# Patient Record
Sex: FEMALE | Race: BLACK OR AFRICAN AMERICAN | Employment: UNEMPLOYED | URBAN - METROPOLITAN AREA
[De-identification: names, ages, dates, MRNs, and addresses within clinical notes are randomized per-mention and may not be internally consistent; named-entity substitution may affect disease eponyms.]

---

## 2020-12-15 ENCOUNTER — TELEPHONE (OUTPATIENT)
Dept: FAMILY MEDICINE CLINIC | Age: 45
End: 2020-12-15

## 2020-12-15 NOTE — TELEPHONE ENCOUNTER
----- Message from Ale Benavides sent at 12/11/2020  6:14 PM EST -----  Regarding: Dr. Mackenzie Beltran not available    Caller's first and last name and relationship to patient (if not the patient):      Best contact number: [phone]      Preferred date and time: First available       Scheduled appointment date and time: N/A       Reason for appointment: Pt is requesting to have a NP and a worker comp. Paperwork that needs to be completed. She has a complex medical history and wants some assistance soon.   (Pt was very Irate about not getting care)      Details to clarify the request:      Ale Benavides

## 2022-05-11 ENCOUNTER — TELEPHONE (OUTPATIENT)
Dept: FAMILY MEDICINE CLINIC | Age: 47
End: 2022-05-11
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
## 2022-12-15 ENCOUNTER — HOSPITAL ENCOUNTER (EMERGENCY)
Age: 47
Discharge: PSYCHIATRIC OR SUBSTANCE USE FACILITY | End: 2022-12-15
Attending: EMERGENCY MEDICINE
Payer: MEDICARE

## 2022-12-15 ENCOUNTER — HOSPITAL ENCOUNTER (INPATIENT)
Age: 47
LOS: 16 days | Discharge: HOME | End: 2022-12-31
Attending: PSYCHIATRY & NEUROLOGY | Admitting: PSYCHIATRY & NEUROLOGY
Payer: MEDICARE

## 2022-12-15 VITALS
HEART RATE: 92 BPM | SYSTOLIC BLOOD PRESSURE: 119 MMHG | HEIGHT: 65 IN | WEIGHT: 274 LBS | DIASTOLIC BLOOD PRESSURE: 76 MMHG | RESPIRATION RATE: 20 BRPM | BODY MASS INDEX: 45.65 KG/M2 | OXYGEN SATURATION: 93 % | TEMPERATURE: 98.3 F

## 2022-12-15 DIAGNOSIS — F23 ACUTE PSYCHOSIS (HCC): Primary | ICD-10-CM

## 2022-12-15 PROBLEM — F31.9 BIPOLAR DISORDER WITH PSYCHOTIC FEATURES (HCC): Status: ACTIVE | Noted: 2022-12-15

## 2022-12-15 PROBLEM — F29 PSYCHOSIS (HCC): Status: ACTIVE | Noted: 2022-12-15

## 2022-12-15 LAB
ALBUMIN SERPL-MCNC: 3.4 G/DL (ref 3.5–5)
ALBUMIN/GLOB SERPL: 0.9 {RATIO} (ref 1.1–2.2)
ALP SERPL-CCNC: 94 U/L (ref 45–117)
ALT SERPL-CCNC: 41 U/L (ref 12–78)
AMPHET UR QL SCN: NEGATIVE
ANION GAP SERPL CALC-SCNC: 6 MMOL/L (ref 5–15)
APPEARANCE UR: CLEAR
AST SERPL-CCNC: 11 U/L (ref 15–37)
BACTERIA URNS QL MICRO: ABNORMAL /HPF
BARBITURATES UR QL SCN: NEGATIVE
BASOPHILS # BLD: 0 K/UL (ref 0–0.1)
BASOPHILS NFR BLD: 0 % (ref 0–1)
BDY SITE: ABNORMAL
BENZODIAZ UR QL: NEGATIVE
BILIRUB SERPL-MCNC: 0.2 MG/DL (ref 0.2–1)
BILIRUB UR QL: NEGATIVE
BUN SERPL-MCNC: 11 MG/DL (ref 6–20)
BUN/CREAT SERPL: 13 (ref 12–20)
CALCIUM SERPL-MCNC: 9.5 MG/DL (ref 8.5–10.1)
CANNABINOIDS UR QL SCN: NEGATIVE
CHLORIDE SERPL-SCNC: 107 MMOL/L (ref 97–108)
CO2 SERPL-SCNC: 23 MMOL/L (ref 21–32)
COCAINE UR QL SCN: NEGATIVE
COHGB MFR BLD: 0.8 % (ref 1–2)
COLOR UR: ABNORMAL
COMMENT, HOLDF: NORMAL
CREAT SERPL-MCNC: 0.86 MG/DL (ref 0.55–1.02)
DIFFERENTIAL METHOD BLD: ABNORMAL
DRUG SCRN COMMENT,DRGCM: NORMAL
EOSINOPHIL # BLD: 0 K/UL (ref 0–0.4)
EOSINOPHIL NFR BLD: 0 % (ref 0–7)
EPITH CASTS URNS QL MICRO: ABNORMAL /LPF
ERYTHROCYTE [DISTWIDTH] IN BLOOD BY AUTOMATED COUNT: 14.5 % (ref 11.5–14.5)
ETHANOL SERPL-MCNC: <10 MG/DL
FLUAV RNA SPEC QL NAA+PROBE: NOT DETECTED
FLUBV RNA SPEC QL NAA+PROBE: NOT DETECTED
GLOBULIN SER CALC-MCNC: 3.8 G/DL (ref 2–4)
GLUCOSE SERPL-MCNC: 210 MG/DL (ref 65–100)
GLUCOSE UR STRIP.AUTO-MCNC: NEGATIVE MG/DL
HCG SERPL QL: NEGATIVE
HCT VFR BLD AUTO: 39.5 % (ref 35–47)
HGB BLD-MCNC: 12.5 G/DL (ref 11.5–16)
HGB UR QL STRIP: NEGATIVE
HYALINE CASTS URNS QL MICRO: ABNORMAL /LPF (ref 0–5)
IMM GRANULOCYTES # BLD AUTO: 0 K/UL (ref 0–0.04)
IMM GRANULOCYTES NFR BLD AUTO: 1 % (ref 0–0.5)
KETONES UR QL STRIP.AUTO: NEGATIVE MG/DL
LEUKOCYTE ESTERASE UR QL STRIP.AUTO: ABNORMAL
LIPASE SERPL-CCNC: 126 U/L (ref 73–393)
LYMPHOCYTES # BLD: 1 K/UL (ref 0.8–3.5)
LYMPHOCYTES NFR BLD: 17 % (ref 12–49)
MCH RBC QN AUTO: 27.9 PG (ref 26–34)
MCHC RBC AUTO-ENTMCNC: 31.6 G/DL (ref 30–36.5)
MCV RBC AUTO: 88.2 FL (ref 80–99)
METHADONE UR QL: NEGATIVE
METHGB MFR BLD: 0.3 % (ref 0–1.4)
MONOCYTES # BLD: 0.3 K/UL (ref 0–1)
MONOCYTES NFR BLD: 6 % (ref 5–13)
NEUTS SEG # BLD: 4.3 K/UL (ref 1.8–8)
NEUTS SEG NFR BLD: 76 % (ref 32–75)
NITRITE UR QL STRIP.AUTO: NEGATIVE
NRBC # BLD: 0 K/UL (ref 0–0.01)
NRBC BLD-RTO: 0 PER 100 WBC
OPIATES UR QL: NEGATIVE
OXYHGB MFR BLD: 61 % (ref 94–97)
PCP UR QL: NEGATIVE
PH UR STRIP: 7 [PH] (ref 5–8)
PLATELET # BLD AUTO: 294 K/UL (ref 150–400)
PMV BLD AUTO: 10.3 FL (ref 8.9–12.9)
POTASSIUM SERPL-SCNC: 4 MMOL/L (ref 3.5–5.1)
PROT SERPL-MCNC: 7.2 G/DL (ref 6.4–8.2)
PROT UR STRIP-MCNC: NEGATIVE MG/DL
RBC # BLD AUTO: 4.48 M/UL (ref 3.8–5.2)
RBC #/AREA URNS HPF: ABNORMAL /HPF (ref 0–5)
SAMPLES BEING HELD,HOLD: NORMAL
SAO2 % BLD: 62 % (ref 95–99)
SARS-COV-2, COV2: NOT DETECTED
SODIUM SERPL-SCNC: 136 MMOL/L (ref 136–145)
SP GR UR REFRACTOMETRY: 1.01 (ref 1–1.03)
SPECIMEN SITE: ABNORMAL
UR CULT HOLD, URHOLD: NORMAL
UROBILINOGEN UR QL STRIP.AUTO: 0.2 EU/DL (ref 0.2–1)
WBC # BLD AUTO: 5.6 K/UL (ref 3.6–11)
WBC URNS QL MICRO: ABNORMAL /HPF (ref 0–4)

## 2022-12-15 PROCEDURE — 80053 COMPREHEN METABOLIC PANEL: CPT

## 2022-12-15 PROCEDURE — 82077 ASSAY SPEC XCP UR&BREATH IA: CPT

## 2022-12-15 PROCEDURE — 99285 EMERGENCY DEPT VISIT HI MDM: CPT

## 2022-12-15 PROCEDURE — 80307 DRUG TEST PRSMV CHEM ANLYZR: CPT

## 2022-12-15 PROCEDURE — 87636 SARSCOV2 & INF A&B AMP PRB: CPT

## 2022-12-15 PROCEDURE — 74011000250 HC RX REV CODE- 250: Performed by: EMERGENCY MEDICINE

## 2022-12-15 PROCEDURE — 84703 CHORIONIC GONADOTROPIN ASSAY: CPT

## 2022-12-15 PROCEDURE — 65220000001 HC RM PRIVATE PSYCH

## 2022-12-15 PROCEDURE — 74011250636 HC RX REV CODE- 250/636: Performed by: EMERGENCY MEDICINE

## 2022-12-15 PROCEDURE — 83690 ASSAY OF LIPASE: CPT

## 2022-12-15 PROCEDURE — 36415 COLL VENOUS BLD VENIPUNCTURE: CPT

## 2022-12-15 PROCEDURE — 85025 COMPLETE CBC W/AUTO DIFF WBC: CPT

## 2022-12-15 PROCEDURE — 90791 PSYCH DIAGNOSTIC EVALUATION: CPT

## 2022-12-15 PROCEDURE — 82375 ASSAY CARBOXYHB QUANT: CPT

## 2022-12-15 PROCEDURE — 81001 URINALYSIS AUTO W/SCOPE: CPT

## 2022-12-15 PROCEDURE — 96372 THER/PROPH/DIAG INJ SC/IM: CPT

## 2022-12-15 RX ORDER — ADHESIVE BANDAGE
30 BANDAGE TOPICAL DAILY PRN
Status: DISCONTINUED | OUTPATIENT
Start: 2022-12-15 | End: 2022-12-31 | Stop reason: HOSPADM

## 2022-12-15 RX ORDER — ACETAMINOPHEN 325 MG/1
650 TABLET ORAL
Status: DISCONTINUED | OUTPATIENT
Start: 2022-12-15 | End: 2022-12-31 | Stop reason: HOSPADM

## 2022-12-15 RX ORDER — BENZTROPINE MESYLATE 1 MG/1
1 TABLET ORAL
Status: DISCONTINUED | OUTPATIENT
Start: 2022-12-15 | End: 2022-12-15

## 2022-12-15 RX ORDER — OLANZAPINE 5 MG/1
5 TABLET ORAL
Status: DISCONTINUED | OUTPATIENT
Start: 2022-12-15 | End: 2022-12-15

## 2022-12-15 RX ORDER — LORAZEPAM 2 MG/ML
1 INJECTION INTRAMUSCULAR
Status: DISCONTINUED | OUTPATIENT
Start: 2022-12-15 | End: 2022-12-31 | Stop reason: HOSPADM

## 2022-12-15 RX ORDER — TRAZODONE HYDROCHLORIDE 50 MG/1
50 TABLET ORAL
Status: DISCONTINUED | OUTPATIENT
Start: 2022-12-15 | End: 2022-12-31 | Stop reason: HOSPADM

## 2022-12-15 RX ORDER — DIPHENHYDRAMINE HYDROCHLORIDE 50 MG/ML
50 INJECTION, SOLUTION INTRAMUSCULAR; INTRAVENOUS
Status: DISCONTINUED | OUTPATIENT
Start: 2022-12-15 | End: 2022-12-15

## 2022-12-15 RX ORDER — LORAZEPAM 2 MG/ML
1 INJECTION INTRAMUSCULAR
Status: DISCONTINUED | OUTPATIENT
Start: 2022-12-15 | End: 2022-12-15

## 2022-12-15 RX ORDER — OLANZAPINE 10 MG/1
10 TABLET ORAL
Status: DISCONTINUED | OUTPATIENT
Start: 2022-12-15 | End: 2022-12-31 | Stop reason: HOSPADM

## 2022-12-15 RX ORDER — HYDROXYZINE 50 MG/1
50 TABLET, FILM COATED ORAL
Status: DISCONTINUED | OUTPATIENT
Start: 2022-12-15 | End: 2022-12-31 | Stop reason: HOSPADM

## 2022-12-15 RX ORDER — LORAZEPAM 1 MG/1
1 TABLET ORAL
Status: DISCONTINUED | OUTPATIENT
Start: 2022-12-15 | End: 2022-12-31 | Stop reason: HOSPADM

## 2022-12-15 RX ORDER — OLANZAPINE 10 MG/1
10 INJECTION, POWDER, LYOPHILIZED, FOR SOLUTION INTRAMUSCULAR
Status: DISCONTINUED | OUTPATIENT
Start: 2022-12-15 | End: 2022-12-31 | Stop reason: HOSPADM

## 2022-12-15 RX ORDER — DIAZEPAM 10 MG/2ML
10 INJECTION INTRAMUSCULAR ONCE
Status: COMPLETED | OUTPATIENT
Start: 2022-12-15 | End: 2022-12-15

## 2022-12-15 RX ADMIN — DIAZEPAM 10 MG: 5 INJECTION, SOLUTION INTRAMUSCULAR; INTRAVENOUS at 06:31

## 2022-12-15 RX ADMIN — OLANZAPINE 10 MG: 10 INJECTION, POWDER, LYOPHILIZED, FOR SOLUTION INTRAMUSCULAR at 06:31

## 2022-12-15 NOTE — ED NOTES
Pt changed into green gown. Belongings relinquished to [employer] per their request.     Room secured. [employer] at bedside.
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
## 2022-12-15 NOTE — ED NOTES
Per [employer], patient has been made a TDO by [facility] with [facility]
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
## 2022-12-15 NOTE — ED NOTES
Per Anant Frey with THE [facility], patient is not a TDO at this time. David Bess has been notified to see patient and Marzena Peguero is being pulled in to see if they can offer some assistance.
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
## 2022-12-15 NOTE — ED NOTES
[address] [facility] Department here to transport the patient to [facility] mental health unit.
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
## 2022-12-15 NOTE — ED NOTES
I am being told now that the patient is undergoing a TDO process. Her carboxyhemoglobin appears to be low. Patient will be cleared medically for psychiatric admission.
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
## 2022-12-15 NOTE — ED NOTES
Patient was placed under ECO at 0543 and at 0900 patient is now a TDO.  is at bedside. Patient belongings were taken by [facility]. Sitter at bedside.

## 2022-12-15 NOTE — ED NOTES
Patient has been accepted at [facility] for psychiatric admission by Dr. César Collier. Patient is stable in the ED and is suitable for transfer. EMTALA is filled out.

## 2022-12-16 PROCEDURE — 74011250636 HC RX REV CODE- 250/636: Performed by: PSYCHIATRY & NEUROLOGY

## 2022-12-16 PROCEDURE — 65220000001 HC RM PRIVATE PSYCH

## 2022-12-16 PROCEDURE — 74011250637 HC RX REV CODE- 250/637: Performed by: PSYCHIATRY & NEUROLOGY

## 2022-12-16 RX ORDER — ARIPIPRAZOLE 5 MG/1
30 TABLET ORAL DAILY
Status: DISCONTINUED | OUTPATIENT
Start: 2022-12-17 | End: 2022-12-16

## 2022-12-16 RX ORDER — ARIPIPRAZOLE 5 MG/1
30 TABLET ORAL DAILY
Status: DISCONTINUED | OUTPATIENT
Start: 2022-12-16 | End: 2022-12-20

## 2022-12-16 RX ORDER — DIVALPROEX SODIUM 500 MG/1
500 TABLET, DELAYED RELEASE ORAL 2 TIMES DAILY
Status: DISCONTINUED | OUTPATIENT
Start: 2022-12-16 | End: 2022-12-20

## 2022-12-16 RX ADMIN — OLANZAPINE 10 MG: 10 INJECTION, POWDER, FOR SOLUTION INTRAMUSCULAR at 11:17

## 2022-12-16 RX ADMIN — OLANZAPINE 10 MG: 10 TABLET, FILM COATED ORAL at 20:56

## 2022-12-16 RX ADMIN — DIVALPROEX SODIUM 500 MG: 500 TABLET, DELAYED RELEASE ORAL at 20:56

## 2022-12-16 RX ADMIN — TRAZODONE HYDROCHLORIDE 50 MG: 50 TABLET ORAL at 20:56

## 2022-12-16 NOTE — GROUP NOTE
IP  GROUP DOCUMENTATION INDIVIDUAL                                                                          Group Therapy Note    Date: 12/16/2022    Group Start Time: 1525  Group End Time: 4602  Group Topic: Recreational/Music Therapy    SRM 2  NON ACUTE    Isabela Scott    IP [address] [facility] GROUP DOCUMENTATION GROUP    Group Therapy Note    Facilitated leisure skills group to reinforce positive coping and to manage mood through music, social interaction, group activities and art task    Attendees: 5/9       Attendance: Did not attend    Additional Notes:  Encouraged but did not attend    BRENNON DominguezS

## 2022-12-16 NOTE — BH NOTES
PSA PART II ADDITIONAL INFORMATION        Access To [facility]: No    Substance Use: NO    Last Use:     Type of Substance: no substance use    Frequency of Use:     Request to See : NO    If yes, notified : NO    Guardian:NO    Guardian Contact:    Release of Information Signed: NO    Release of Information Signed For:     Treatment Plan: pt refused to sign at this time
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
## 2022-12-16 NOTE — GROUP NOTE
IP  GROUP DOCUMENTATION INDIVIDUAL                                                                          Group Therapy Note    Date: 12/16/2022    Group Start Time: 1120  Group End Time: 1200  Group Topic: Education Group - Inpatient    [facility] 2  NON ACUTE    Loretta Babinski    IP [address] [facility] GROUP DOCUMENTATION GROUP    Group Therapy Note    Facilitated discussion on  stress exploration focused on  being able to identify daily hassles, major life changes and life circumstances that contribute to stress and also identify daily uplifts, healthy coping strategies and protective factors that counteract stress    Attendees: 4/12       Attendance: Did not attend    Additional Notes:  Encouraged but did attend    CORNELL Lyons

## 2022-12-16 NOTE — BH NOTES
Pt up on unit sitting in dayroom, talking loud at times , denies any SI/HI, denies any depression or anxiety. Pt is religiously pre occupied sitting in room praying loud. Pt remain on close observation.
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
## 2022-12-16 NOTE — PROGRESS NOTES
Problem: Falls - Risk of  Goal: *Absence of Falls  Description: Document Pratt Reason Fall Risk and appropriate interventions in the flowsheet.   Outcome: Progressing Towards Goal  Note: Fall Risk Interventions:            Medication Interventions: Teach patient to arise slowly
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
## 2022-12-17 PROCEDURE — 65220000001 HC RM PRIVATE PSYCH

## 2022-12-17 PROCEDURE — 74011250637 HC RX REV CODE- 250/637: Performed by: PSYCHIATRY & NEUROLOGY

## 2022-12-17 RX ADMIN — ARIPIPRAZOLE 30 MG: 5 TABLET ORAL at 09:30

## 2022-12-17 RX ADMIN — DIVALPROEX SODIUM 500 MG: 500 TABLET, DELAYED RELEASE ORAL at 09:30

## 2022-12-17 RX ADMIN — DIVALPROEX SODIUM 500 MG: 500 TABLET, DELAYED RELEASE ORAL at 21:01

## 2022-12-17 RX ADMIN — OLANZAPINE 10 MG: 10 TABLET, FILM COATED ORAL at 16:50

## 2022-12-17 NOTE — GROUP NOTE
[facility]  GROUP DOCUMENTATION INDIVIDUAL                                                                          Group Therapy Note    Date: 12/17/2022    Group Start Time: 1030  Group End Time: 1100  Group Topic: Nursing    [facility] 2 BEHA [facility]    Lino Davis LPN IP  GROUP DOCUMENTATION GROUP    Group Therapy Note    Attendees: 5/8       Attendance: Did not attend    Patient's Goal:  Increase knowledge of ways to decrease stress    Interventions/techniques: Follows Directions:     Interactions:     Mental Status:     Behavior/appearance:     Goals Achieved: Additional Notes:  Pt. Invited she did not attend.     Jack Brasher LPN

## 2022-12-17 NOTE — GROUP NOTE
IP  GROUP DOCUMENTATION INDIVIDUAL                                                                          Group Therapy Note    Date: 12/17/2022    Group Start Time: 1120  Group End Time: 1200  Group Topic: Education Group - Inpatient    [facility] 2  NON ACUTE    Angelita Rodney    IP [facility]150 [facility] GROUP DOCUMENTATION GROUP    Group Therapy Note    Facilitated group to introduce a strategy to communicate in a more effective way that will help to express wants and needs in a way that is respectful to oneself and others to help increase a positive outcome from the interaction .  Provided handout relating to the strategy Dear Alexis, the meaning of the acronym and the description of each step with an example    Attendees: 5/8       Attendance: Did not attend    Additional Notes:  Encouraged but did not attend    CORNELL Vila
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
## 2022-12-17 NOTE — GROUP NOTE
IP  GROUP DOCUMENTATION INDIVIDUAL                                                                          Group Therapy Note    Date: 12/17/2022    Group Start Time: 1525  Group End Time: 1967  Group Topic: Recreational/Music Therapy    [facility] 2  NON ACUTE    Diamond Snellen    IP 1150 [facility] GROUP DOCUMENTATION GROUP    Group Therapy Note    Facilitated leisure skills group to reinforce positive coping and to manage mood through music, social interaction, group activities and art task    Attendees: 7/8       Attendance: Attended    Patient's Goal:  Attend group daily     Interventions/techniques: Art integration and Supported    Follows Directions: Followed directions    Interactions: Interacted appropriately    Mental Status: Calm and Flat    Behavior/appearance: Cooperative    Goals Achieved: Able to engage in interactions and Able to listen to others      Additional Notes:  Receptive to listening to music while working on leisure task.  Interacted with staff when prompted    CORNELL Mahoney
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
## 2022-12-17 NOTE — PROGRESS NOTES
Problem: Patient Education: Go to Patient Education Activity  Goal: Patient/Family Education  Outcome: Progressing Towards Goal     Problem: Falls - Risk of  Goal: *Absence of Falls  Description: Document Lillian Thurston Fall Risk and appropriate interventions in the flowsheet.   Outcome: Progressing Towards Goal  Note: Fall Risk Interventions:            Medication Interventions: Teach patient to arise slowly                   Problem: Risk for Elopement  Goal: Patient will not exit the unit/facility without proper escort  Outcome: Progressing Towards Goal

## 2022-12-18 PROCEDURE — 65220000001 HC RM PRIVATE PSYCH

## 2022-12-18 PROCEDURE — 74011250637 HC RX REV CODE- 250/637: Performed by: PSYCHIATRY & NEUROLOGY

## 2022-12-18 RX ADMIN — ARIPIPRAZOLE 30 MG: 5 TABLET ORAL at 08:54

## 2022-12-18 RX ADMIN — DIVALPROEX SODIUM 500 MG: 500 TABLET, DELAYED RELEASE ORAL at 21:10

## 2022-12-18 RX ADMIN — DIVALPROEX SODIUM 500 MG: 500 TABLET, DELAYED RELEASE ORAL at 08:54

## 2022-12-18 NOTE — BH NOTES
Behavioral Health Treatment Team Note     Patient goal(s) for today: \"to leave\"   Treatment team focus/goals: Attend group and continue medication management     Progress note: The patient presented in the day room and was sitting by the phone, she appeared to be in need of some grooming and was oriented x3. She presented a manic mood with a delusional affect and was somewhat redirectable. The writer was unable to assess SI/HI/AVH at this time but will continue to follow up. She was fixated on getting in touch with  to get her 450M and leaving the hospital to attend a . The writer informed her she has a hearing tomorrow that she will need to attend first and explained it's purpose. An inpatient level of care is needed at this time for medication stabilization and safe discharge planning. LOS:  3  Expected LOS: Hearing 2022    Insurance info/prescription coverage:  Medicare   Date of last family contact:  2022  Family requesting physician contact today:  no  Discharge plan:  Medication stabilization   Guns in the home:  no   Outpatient provider(s):  Will be coordinated     Participating treatment team members: Eduardo Gar
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
## 2022-12-18 NOTE — PROGRESS NOTES
Problem: General Medical Care Plan  Goal: *Absence of infection signs and symptoms  Outcome: Progressing Towards Goal

## 2022-12-18 NOTE — GROUP NOTE
IP  GROUP DOCUMENTATION INDIVIDUAL                                                                          Group Therapy Note    Date: 12/18/2022    Group Start Time: 1520  Group End Time: 2396  Group Topic: Recreational/Music Therapy    [facility] 2  NON ACUTE    Martine Laser    IP 1150 [facility] GROUP DOCUMENTATION GROUP    Group Therapy Note    Facilitated leisure skills group to reinforce positive coping and to manage mood through music, social interaction, group activities and art task    Attendees: 7/8       Attendance: Did not attend    Additional Notes:  Encouraged but did not attend     Bubbazana Dos Santos, [address]
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
## 2022-12-18 NOTE — GROUP NOTE
IP  GROUP DOCUMENTATION INDIVIDUAL                                                                          Group Therapy Note    Date: 12/18/2022    Group Start Time: 1120  Group End Time: 2995  Group Topic: Education Group - Inpatient    [facility] 2  NON ACUTE    Claude Newcomer    IP 1150 [facility] GROUP DOCUMENTATION GROUP    Group Therapy Note    Facilitated discussion focus on understanding the importance of healthy boundaries and developing healthy boundaries to improve relationships    Attendees: 4/8       Attendance: Did not attend    Additional Notes:  Encouraged but did not attend    CORNELL Davies

## 2022-12-18 NOTE — BH NOTES
Nursing Note    Patient is alert and oriented x 2. She refused nursing assessment questions. Restricted affect and isolated mood. Patient remained in bed for the majority of the shift. Patient questioned her evening medications, stating that she has had medications this morning. Nurse informed patient that she has Depakote  mg PO scheduled this evening. Patient asked if the medication was going to help her loose weight. She voiced no complaints and accepted her medications without difficulty. Staff will continue to monitor patient for safety.

## 2022-12-18 NOTE — PROGRESS NOTES
Problem: Falls - Risk of  Goal: *Absence of Falls  Description: Document Aldo Byrd Fall Risk and appropriate interventions in the flowsheet.   Outcome: Progressing Towards Goal  Note: Fall Risk Interventions:            Medication Interventions: Teach patient to arise slowly

## 2022-12-19 LAB — VALPROATE SERPL-MCNC: 69 UG/ML (ref 50–100)

## 2022-12-19 PROCEDURE — 36415 COLL VENOUS BLD VENIPUNCTURE: CPT

## 2022-12-19 PROCEDURE — 80164 ASSAY DIPROPYLACETIC ACD TOT: CPT

## 2022-12-19 PROCEDURE — 74011250637 HC RX REV CODE- 250/637: Performed by: PSYCHIATRY & NEUROLOGY

## 2022-12-19 PROCEDURE — 65220000001 HC RM PRIVATE PSYCH

## 2022-12-19 RX ORDER — MAG HYDROX/ALUMINUM HYD/SIMETH 200-200-20
30 SUSPENSION, ORAL (FINAL DOSE FORM) ORAL
Status: DISCONTINUED | OUTPATIENT
Start: 2022-12-19 | End: 2022-12-31 | Stop reason: HOSPADM

## 2022-12-19 RX ADMIN — DIVALPROEX SODIUM 500 MG: 500 TABLET, DELAYED RELEASE ORAL at 08:28

## 2022-12-19 RX ADMIN — ARIPIPRAZOLE 30 MG: 5 TABLET ORAL at 08:28

## 2022-12-19 RX ADMIN — MAGNESIUM HYDROXIDE 30 ML: 400 SUSPENSION ORAL at 19:40

## 2022-12-19 RX ADMIN — DIVALPROEX SODIUM 500 MG: 500 TABLET, DELAYED RELEASE ORAL at 21:12

## 2022-12-19 NOTE — PROGRESS NOTES
Problem: Falls - Risk of  Goal: *Absence of Falls  Description: Document Luis Sol Fall Risk and appropriate interventions in the flowsheet.   Outcome: Progressing Towards Goal  Note: Fall Risk Interventions:            Medication Interventions: Teach patient to arise slowly                   Problem: Risk for Elopement  Goal: Patient will not exit the unit/facility without proper escort  Outcome: Progressing Towards Goal

## 2022-12-19 NOTE — GROUP NOTE
IP  GROUP DOCUMENTATION INDIVIDUAL                                                                          Group Therapy Note    Date: 12/19/2022    Group Start Time: 1120  Group End Time: 1200  Group Topic: Education Group - Inpatient    [facility] 2  NON ACUTE    Ahmet Phillips    IP 1150 [facility] GROUP DOCUMENTATION GROUP    Group Therapy Note    Facilitated group to introduce the definition of self-esteem and discuss information relating to creating steps to greater self-appreciation and recognizing symptoms of self-defeat    Attendees: 6/9       Attendance: Attended    Patient's Goal:  Attend group daily     Interventions/techniques: Informed and Supported    Follows Directions: Followed directions    Interactions: Interacted appropriately    Mental Status: Calm    Behavior/appearance: Cooperative and Needed prompting    Goals Achieved: Able to engage in interactions, Able to listen to others, Able to self-disclose, and Discussed self-esteem issues      Additional Notes:  Receptive to information discussed and engaged with prompting.  Pt was able to recognize symptoms of self-defeat and was able to share a positive way to improve her self-esteem    Cristofer Duong, [address]

## 2022-12-19 NOTE — GROUP NOTE
[facility] GROUP DOCUMENTATION INDIVIDUAL                                                                          Group Therapy Note    Date: 12/19/2022    Group Start Time: 4869  Group End Time: 1400  Group Topic: Process Group - Inpatient    SRM 2 [facility]HA HLTH ACUTE    Leita Clarity    IP 1150 [facility] GROUP DOCUMENTATION GROUP    Group Therapy Note: Facilitator engaged the group in discussion about \"Coping Ahead\" to be prepared for life stressors. Attendees: 4       Attendance: Attended    Patient's Goal:  To attend groups    Interventions/techniques: Informed, Validated, and Supported    Follows Directions: Followed directions    Interactions: Interacted appropriately    Mental Status: Calm, Congruent, and Preoccupied    Behavior/appearance: Attentive and Grooming impaired    Goals Achieved: Able to engage in interactions, Able to listen to others, and Able to give feedback to another      Additional Notes:  Tangential. Detached.  Made a statement about housing, \"You didn't act right so you can't come in.\"     Metta Hazard

## 2022-12-19 NOTE — BH NOTES
Nursing Note    Patient is alert and oriented x 2. She denies any SI/HI/AH/VH. Patient denies any anxiety or depression. Restricted affect and isolated mood. Patient remained in her room for the majority of the shift. She accepted her medications  and voiced no complaints. Patient asked questions about her millions of dollars and stated that she is living off of $1000 a month and wants to know who is going to make her living arrangements when she is discharged. 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 Staff will continue to monitor for safety.
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
## 2022-12-19 NOTE — PROGRESS NOTES
Progress Note  Date:2022       Room:[facility][note 3]  Patient Name:Liss Stevens     YOB: 1975     Age:47 y.o. Subjective    Subjective   Naya Seip 49-year-old female who continues to struggle with flight of ideas and loose associations, disorganized thoughts. She has been compliant with her medication. Still presents with paranoia and suspicious. Mental status examination-patient is alert and oriented to name place being in the hospital but poor insight into her reason for hospitalization. She has been continued to be provided the discussion what led to her hospitalization but repeats that she does not need to be here and very disconnected to the situations leading to her hospitalization. She often resorts to political persons interfering in her not getting her what she deserves. Review of Systems  Objective         Vitals Last 24 Hours:  TEMPERATURE:  Temp  Av.9 °F (37.2 °C)  Min: 98.8 °F (37.1 °C)  Max: 98.9 °F (37.2 °C)  RESPIRATIONS RANGE: Resp  Av.7  Min: 17  Max: 18  PULSE OXIMETRY RANGE: SpO2  Av %  Min: 98 %  Max: 98 %  PULSE RANGE: Pulse  Av  Min: 86  Max: 89  BLOOD PRESSURE RANGE: Systolic (55ACU), MFW:248 , Min:113 , OBN:193   ; Diastolic (35UTY), JEU:33, Min:75, Max:76    I/O (24Hr): No intake or output data in the 24 hours ending 22 1329  Objective  Labs/Imaging/Diagnostics    Labs:  CBC:No results for input(s): WBC, RBC, HGB, HCT, MCV, RDW, PLT, HGBEXT, HCTEXT, PLTEXT in the last 72 hours. CHEMISTRIES:No results for input(s): NA, K, CL, CO2, BUN, CA, PHOS, MG in the last 72 hours. No lab exists for component: CREATININE, GLUCOSEPT/INR:No results for input(s): INR, INREXT in the last 72 hours. No lab exists for component: PROTIME  APTT:No results for input(s): APTT in the last 72 hours. LIVER PROFILE:No results for input(s): AST, ALT in the last 72 hours.     No lab exists for component: BILIDIR, SINCERE [facility]  Lab Results   Component Value Date/Time    ALT (SGPT) 41 12/15/2022 07:01 AM    AST (SGOT) 11 (L) 12/15/2022 07:01 AM    Alk. phosphatase 94 12/15/2022 07:01 AM    Bilirubin, direct 0.1 10/31/2012 08:25 AM    Bilirubin, total 0.2 12/15/2022 07:01 AM       Imaging Last 24 Hours:  No results found. Assessment//Plan   Active Problems:    Psychosis ([facility] [address] (12/15/2022)      Bipolar disorder with psychotic features ([facility] [address] (12/15/2022)      Assessment & Plan  Depakote level to be obtained  To obtain previous treatment records  Continue to obtain further collateral and reaching out to family regarding family's   Patient currently TDO to behavioral health floor.   Provided support  Continue to further titrate medications      Current Facility-Administered Medications:     divalproex DR (DEPAKOTE) tablet 500 mg, 500 mg, Oral, BID, Joelle Perez MD, 500 mg at 22 7677    ARIPiprazole (ABILIFY) tablet 30 mg, 30 mg, Oral, DAILY, Joelle Perez MD, 30 mg at 22 5515    hydrOXYzine HCL (ATARAX) tablet 50 mg, 50 mg, Oral, TID PRN, Samuel Cheung MD    traZODone (DESYREL) tablet 50 mg, 50 mg, Oral, QHS PRN, Samuel Cheung MD, 50 mg at 22    acetaminophen (TYLENOL) tablet 650 mg, 650 mg, Oral, Q4H PRN, Joelle Perez MD    magnesium hydroxide (MILK OF MAGNESIA) 400 mg/5 mL oral suspension 30 mL, 30 mL, Oral, DAILY PRN, Joelle Perez MD    OLANZapine (ZyPREXA) tablet 10 mg, 10 mg, Oral, Q8H PRN, Joelle Perez MD, 10 mg at 22 165    OLANZapine (ZyPREXA) injection 10 mg, 10 mg, IntraMUSCular, Q8H PRN, Samuel Cheung MD, 10 mg at 22 1117    LORazepam (ATIVAN) tablet 1 mg, 1 mg, Oral, Q6H PRN, Joelle Perez MD    LORazepam (ATIVAN) injection 1 mg, 1 mg, IntraMUSCular, Q6H PRN, Samuel Cheung MD   Electronically signed by Florentino Davis MD on 2022 at 1:29 PM

## 2022-12-19 NOTE — GROUP NOTE
IP  GROUP DOCUMENTATION INDIVIDUAL                                                                          Group Therapy Note    Date: 12/19/2022    Group Start Time: 5203  Group End Time: 2357  Group Topic: Recreational/Music Therapy    SRM 2  NON ACUTE    Jose E Nathen    IP 1150 [facility] GROUP DOCUMENTATION GROUP    Group Therapy Note    Facilitated leisure skills group to reinforce positive coping and to manage mood through music, social interaction, group activities and art task    Attendees: 6/10       Attendance: Attended    Patient's Goal:  Attend group daily     Interventions/techniques: Art integration and Supported    Follows Directions: Followed directions    Interactions: Interacted appropriately    Mental Status: Calm    Behavior/appearance: Cooperative and Needed prompting    Goals Achieved: Able to engage in interactions and Able to listen to others      Additional Notes:  Receptive to listening to music and a song she selected. Declined to work on leisure task.  Interacted with peers and staff when prompted    Nan Amanda, [address]
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
## 2022-12-20 PROCEDURE — 65220000001 HC RM PRIVATE PSYCH

## 2022-12-20 PROCEDURE — 74011250636 HC RX REV CODE- 250/636: Performed by: PSYCHIATRY & NEUROLOGY

## 2022-12-20 PROCEDURE — 74011250637 HC RX REV CODE- 250/637: Performed by: PSYCHIATRY & NEUROLOGY

## 2022-12-20 RX ORDER — DIVALPROEX SODIUM 500 MG/1
1000 TABLET, DELAYED RELEASE ORAL
Status: DISCONTINUED | OUTPATIENT
Start: 2022-12-20 | End: 2022-12-31 | Stop reason: HOSPADM

## 2022-12-20 RX ORDER — RISPERIDONE 2 MG/1
2 TABLET, FILM COATED ORAL 2 TIMES DAILY
Status: DISCONTINUED | OUTPATIENT
Start: 2022-12-20 | End: 2022-12-27

## 2022-12-20 RX ORDER — DIVALPROEX SODIUM 500 MG/1
500 TABLET, DELAYED RELEASE ORAL DAILY
Status: DISCONTINUED | OUTPATIENT
Start: 2022-12-21 | End: 2022-12-31 | Stop reason: HOSPADM

## 2022-12-20 RX ADMIN — LORAZEPAM 1 MG: 2 INJECTION INTRAMUSCULAR at 10:20

## 2022-12-20 RX ADMIN — ARIPIPRAZOLE 30 MG: 5 TABLET ORAL at 09:42

## 2022-12-20 RX ADMIN — DIVALPROEX SODIUM 1000 MG: 500 TABLET, DELAYED RELEASE ORAL at 21:26

## 2022-12-20 RX ADMIN — DIVALPROEX SODIUM 500 MG: 500 TABLET, DELAYED RELEASE ORAL at 09:42

## 2022-12-20 RX ADMIN — RISPERIDONE 2 MG: 2 TABLET ORAL at 21:26

## 2022-12-20 NOTE — PROGRESS NOTES
Problem: Falls - Risk of  Goal: *Absence of Falls  Description: Document Carrington Flores Fall Risk and appropriate interventions in the flowsheet.   Outcome: Progressing Towards Goal  Note: Fall Risk Interventions:            Medication Interventions: Teach patient to arise slowly                   Problem: General Medical Care Plan  Goal: *Vital signs within specified parameters  Outcome: Progressing Towards Goal

## 2022-12-20 NOTE — BH NOTES
Nursing Note    Patient is alert and oriented x 2. She denies any SI/HI/AH/VH. Patient denies any anxiety or depression. Restricted affect and isolated mood. Patient remained in bed for the majority of the shift. She accepted her medications but is very paranoid about her medications. Patient continue to make statements that she already took her medications and is ready to leave because she has  arrangements to make and has to find somewhere to live. Staff will continue to monitor patient for safety.

## 2022-12-20 NOTE — BH NOTES
Behavioral Health Treatment Team Note     Patient goal(s) for today: 'speak with police'  Treatment team focus/goals: continue medication management, group therapy, maintain ADLS, work on safe discharge plan    Progress note: Pt presents with a flat affect and anxious mood. Pt denies SI/HI/AVH at this time. Pt is semi tidy, oriented x3. Pt presents with pressured, tangential speech which is paranoid in nature. Pt spoke about how she is being held here against her will because of who she is. Pt spoke about how she is a rich woman and that Laveda Brunner wrote her checks. Pt also spoke about how the President is involved but she 'isn't sure exactly how but I know they have been speaking'. Pt requested [place] police department non emergency number which writer provided. Pt still needs an inpatient level of care in order to coordinate a safe discharge plan and stabilize pt medication.      LOS:  5  Expected LOS: TDO until 12/28    Insurance info/prescription coverage:  Medicare   Date of last family contact:  12/19/2022  Family requesting physician contact today:  no  Discharge plan:  Medication stabilization   Guns in the home:  no   Outpatient provider(s): [facility]    Participating treatment team members: Mirtha Arriola, * (assigned SW), ZULEIMA Dominguez

## 2022-12-20 NOTE — GROUP NOTE
IP  GROUP DOCUMENTATION INDIVIDUAL                                                                          Group Therapy Note    Date: 12/20/2022    Group Start Time: 5554  Group End Time: 1400  Group Topic: Process Group - Inpatient    SRM 2 [facility]HA [facility] ACUTE    Laurie Foster    IP 1150 [facility] GROUP DOCUMENTATION GROUP    Group Therapy Note  Process group was focused on pts strengths and weaknesses. Pts were asked \"describe yourself as an animal and tell why you picked that animal\". Pts processed what positive and negative traits they share with the animals. Pts interacted well and started sharing what they believed other pts had as well. Attendees: 5-10       Attendance: Attended    Patient's Goal:  To attend group and participate in activities     Interventions/techniques: Challenged, Validated, and Provide feedback    Follows Directions: Followed directions    Interactions: Interacted appropriately    Mental Status: Calm and Flat    Behavior/appearance: Attentive and Cooperative    Goals Achieved: Able to engage in interactions, Able to listen to others, Able to give feedback to another, Able to reflect/comment on own behavior, Able to manage/cope with feelings, Able to receive feedback, and Able to self-disclose      Additional Notes:  Pt was quiet but participated when asked directly. Pt was observed nodding in agreement with other pts when they were speaking. Pt shared that she is like a \"horse\" because she works hard and is smart.      Katie Roy

## 2022-12-20 NOTE — BH NOTES
Ms. Ranjana Davis complained of gas. She requested Milk of Mag to alleviate her symptoms. She denied severe pain but endorsed discomfort. Primary nurse was notified.

## 2022-12-20 NOTE — PROGRESS NOTES
Spiritual Care Assessment/Progress Note  [facility]      NAME: Lisseth Bauer      MRN: [id number]  AGE: 52 y.o.  SEX: female  Episcopal Affiliation: No Mormonism   Language: English     12/20/2022     Total Time (in minutes): 18     Spiritual Assessment begun in SRM 2 [facility] ACUTE through conversation with:         [x]Patient        [] Family    [] Friend(s)        Reason for Consult: Request by patient     Spiritual beliefs: (Please include comment if needed)     [] Identifies with a kenneth tradition:         [] Supported by a kenneth community:            [] Claims no spiritual orientation:           [] Seeking spiritual identity:                [] Adheres to an individual form of spirituality:           [x] Not able to assess:                           Identified resources for coping:      [] Prayer                               [] Music                  [] Guided Imagery     [] Family/friends                 [] Pet visits     [] Devotional reading                         [x] Unknown     [] Other:                                               Interventions offered during this visit: (See comments for more details)    Patient Interventions: Affirmation of emotions/emotional suffering, Initial/Spiritual assessment, patient floor           Plan of Care:     [] Support spiritual and/or cultural needs    [] Support AMD and/or advance care planning process      [] Support grieving process   [] Coordinate Rites and/or Rituals    [] Coordination with community clergy   [] No spiritual needs identified at this time   [] Detailed Plan of Care below (See Comments)  [] Make referral to Music Therapy  [] Make referral to Pet Therapy     [] Make referral to Addiction services  [] Make referral to [facility]  [] Make referral to Spiritual Care Partner  [] No future visits requested        [x] Contact Spiritual Care for further referrals     Comments: I spent time affirming pt and her frustrations about being in the hospital and financially struggling. I attempted to explore pt's beliefs and hopes, but pt needed to vent and express her anger. I provided prayer and a compassionate presence.     Please [facility]  in order to get in touch with  for any Spiritual Care Needs   [phone]    Signed by: Chaplain Ivette

## 2022-12-20 NOTE — BH NOTES
Pt up ad michelle on unit speaking persecutory and delusional thoughts. Pt states Dr. Leandro Rodriguez is out to get her and she needs to be discharged. Pt explained TDO process and pt needs to be redirected multiple times. Pt denies all- SI/HI/AVH, depression, and anxiety. Pt has negative insight on diagnosis but pt med compliant. Code BONITA and IM ativan given at 1020 due to pt outburst and aggressive verbally.

## 2022-12-20 NOTE — GROUP NOTE
[facility] GROUP DOCUMENTATION INDIVIDUAL                                                                          Group Therapy Note    Date: 12/20/2022    Group Start Time: 1115  Group End Time: 1200  Group Topic: Reflection/Relaxation    SRM 2 [facility] HL[facility] ACUTE    Sid Pelt    IP 1150 [facility] GROUP DOCUMENTATION GROUP    Group Therapy Note  Group therapy was a relaxation group with music. Pts were asked to share a song and explain why they chose the song. Pts interacted well and started signing together. Pts shared Jenna stories with one another.    Attendees: 3-10               Additional Notes:  Pt was encouraged to attend and chose not to     Ronny Anders

## 2022-12-20 NOTE — GROUP NOTE
[facility]  GROUP DOCUMENTATION INDIVIDUAL                                                                          Group Therapy Note    Date: 12/20/2022    Group Start Time: 1400  Group End Time: 1225  Group Topic: Education Group - Inpatient    SRM 2 [facility] HL[facility] ACUTE    Faith Carrington    IP 1150 [facility] GROUP DOCUMENTATION GROUP    Group Therapy Note    Attendees: 1/10    Psych ed: Pts were encouraged to attend group to discuss body image and strengths. Due to acuity of unit, only one pt attended briefly. Writer provided material to pts individually to review. Attendance: Attended    Patient's Goal:  attend activities and groups     Interventions/techniques: Validated, Promoted peer support, Provide feedback, and Supported    Follows Directions: Followed directions    Interactions: Interacted appropriately    Mental Status: Calm, Congruent, and Flat    Behavior/appearance: Attentive, Neatly groomed, and Withdrawn/quiet    Goals Achieved: Able to engage in interactions and Able to listen to others      Additional Notes:  pt said that she enjoys looking at strengths to identify them.  Pt was tangential but able to be redirected     [employer]
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
## 2022-12-21 PROCEDURE — 74011250637 HC RX REV CODE- 250/637: Performed by: PSYCHIATRY & NEUROLOGY

## 2022-12-21 PROCEDURE — 65220000001 HC RM PRIVATE PSYCH

## 2022-12-21 RX ADMIN — DIVALPROEX SODIUM 500 MG: 500 TABLET, DELAYED RELEASE ORAL at 09:32

## 2022-12-21 RX ADMIN — DIVALPROEX SODIUM 1000 MG: 500 TABLET, DELAYED RELEASE ORAL at 21:11

## 2022-12-21 RX ADMIN — RISPERIDONE 2 MG: 2 TABLET ORAL at 21:11

## 2022-12-21 RX ADMIN — RISPERIDONE 2 MG: 2 TABLET ORAL at 09:32

## 2022-12-21 RX ADMIN — ALUMINUM HYDROXIDE, MAGNESIUM HYDROXIDE, AND SIMETHICONE 30 ML: 200; 200; 20 SUSPENSION ORAL at 21:21
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
## 2022-12-21 NOTE — GROUP NOTE
[facility] GROUP DOCUMENTATION INDIVIDUAL                                                                          Group Therapy Note    Date: 12/21/2022    Group Start Time: 1320  Group End Time: 1400  Group Topic: Process Group - Inpatient    SRM 2 BEHA HLTH ACUTE    Laurie Foster    IP 1150 [facility] GROUP DOCUMENTATION GROUP    Group Therapy Note  Process group was focused on exploring pts emotions. Pts were given a \"tangled ball of emotions\" worksheet and asked to color the words that describe their feelings and emotions now, lately and emotions they rarely feel. Pts interacted well and provided feedback to one another.    Attendees: 3-7       Attendance: Did not attend      Additional Notes:  Pt was encouraged to attend and chose not to     [facility]

## 2022-12-21 NOTE — PROGRESS NOTES
Problem: Falls - Risk of  Goal: *Absence of Falls  Description: Document Sha Wise Fall Risk and appropriate interventions in the flowsheet.   12/20/2022 2021 by Lucille Weiss RN  Outcome: Progressing Towards Goal  Note: Fall Risk Interventions:            Medication Interventions: Teach patient to arise slowly                12/20/2022 2021 by Lucille Weiss RN  Outcome: Progressing Towards Goal  Note: Fall Risk Interventions:            Medication Interventions: Teach patient to arise slowly

## 2022-12-21 NOTE — GROUP NOTE
[facility]  GROUP DOCUMENTATION INDIVIDUAL                                                                          Group Therapy Note    Date: 12/21/2022    Group Start Time: 1115  Group End Time: 6235  Group Topic: Education Group - Inpatient    SRM 2 BEHA HLTH ACUTE    [address] GROUP DOCUMENTATION GROUP    Group Therapy Note  Psych-Education group was focused on safety plans for discharge. Pts were given a safety plan worksheet and asked to fill it out as they went over it together. Pts interacted well but lacked insight on their need for a safety plan.    Attendees: 2-7       Attendance: Did not attend      Additional Notes:  Pt was encouraged to attend and chose not to     [facility]

## 2022-12-21 NOTE — BH NOTES
Pt up ad michelle on unit present in day room. Pt approaches nurses station asking about her money from travelers insurance. Pt states Dr. Debbie Rouse and the Zoroast[employer] people are out to get her. Pt states she needs $80 for a cab to get home. Pt denies depression, anxiety, SI/HI/AVH. Pt denies pain. Pt states that she does not need to be inpatient and denies psychiatric diagnosis.

## 2022-12-21 NOTE — PROGRESS NOTES
Liss actively participated in Spirituality Group about \"spirituality and hope\" on [employer]. Ashok Martinez M.  Div [address] can be reached by calling the  [phone]

## 2022-12-21 NOTE — BH NOTES
Nursing Note    Patient is alert and oriented x 2. She denies any SI/HI/AH/VH. Patient denies any anxiety and depression. Restricted affect and isolated. Patient remained in bed for the majority of the shift. She asked questions about her car, living arrangements, and  arrangements of her family members. Patient was referred to case management to address the issues. Staff will continue to monitor patient for safety.

## 2022-12-21 NOTE — PROGRESS NOTES
Progress Note  Date:2022       Room:[facility]  Patient Name:Liss Stevens     YOB: 1975     Age:47 y.o. Subjective    Subjective   Patient continues to present delusional, paranoid with loose associations flight of ideas, grandiose having billion dollars check, to wanting to get her Worker's Compensation and that they are against her and not paying her patient has been compliant with the medication. Mental status examination-patient is alert and oriented to name place person totally disconnected with the content of her loss of family members. Presents labile grandiose paranoid delusional with loose associations. Insight remains impaired judgment remains impaired. Review of Systems  Objective         Vitals Last 24 Hours:  TEMPERATURE:  Temp  Av.6 °F (37 °C)  Min: 98.6 °F (37 °C)  Max: 98.6 °F (37 °C)  RESPIRATIONS RANGE: Resp  Av  Min: 19  Max: 19  PULSE OXIMETRY RANGE: No data recorded  PULSE RANGE: Pulse  Av  Min: 95  Max: 95  BLOOD PRESSURE RANGE: Systolic (83DKD), VYA:595 , Min:130 , ATI:681   ; Diastolic (21OXU), QRJ:87, Min:69, Max:69    I/O (24Hr): No intake or output data in the 24 hours ending 22 2340  Objective  Labs/Imaging/Diagnostics    Labs:  CBC:No results for input(s): WBC, RBC, HGB, HCT, MCV, RDW, PLT, HGBEXT, HCTEXT, PLTEXT in the last 72 hours. CHEMISTRIES:No results for input(s): NA, K, CL, CO2, BUN, CA, PHOS, MG in the last 72 hours. No lab exists for component: CREATININE, GLUCOSEPT/INR:No results for input(s): INR, INREXT in the last 72 hours. No lab exists for component: PROTIME  APTT:No results for input(s): APTT in the last 72 hours. LIVER PROFILE:No results for input(s): AST, ALT in the last 72 hours. No lab exists for component: Shelley Greet, ALKPHOS  Lab Results   Component Value Date/Time    ALT (SGPT) 41 12/15/2022 07:01 AM    AST (SGOT) 11 (L) 12/15/2022 07:01 AM    Alk.  phosphatase 94 12/15/2022 07:01 AM    Bilirubin, direct 0.1 10/31/2012 08:25 AM    Bilirubin, total 0.2 12/15/2022 07:01 AM       Imaging Last 24 Hours:  No results found. Assessment//Plan   Active Problems:    Psychosis ([facility] [address] (12/15/2022)      Bipolar disorder with psychotic features ([facility] (12/15/2022)      Assessment & Plan  Discontinue Abilify as patient is not improving as much expected  Started on Risperdal 2 mg p.o. twice daily to continue to address underlying paranoia or delusions  Patient was discussed regarding monthly decanoate injection.   Patient declined  Patient is noted allergic to Haldol  Provided encouragement and support, she is able to keep conversation and appropriate briefly then becomes paranoid towards this writer      Current Facility-Administered Medications:     risperiDONE (RisperDAL) tablet 2 mg, 2 mg, Oral, BID, Joelle Perez MD, 2 mg at 12/20/22 2126    [START ON 12/21/2022] divalproex DR (DEPAKOTE) tablet 500 mg, 500 mg, Oral, DAILY, Joelle Perez MD    divalproex DR (DEPAKOTE) tablet 1,000 mg, 1,000 mg, Oral, QHS, Joelle Perez MD, 1,000 mg at 12/20/22 2126    alum-mag hydroxide-simeth (MYLANTA) oral suspension 30 mL, 30 mL, Oral, QID PRN, Steffanie Cunha MD    hydrOXYzine HCL (ATARAX) tablet 50 mg, 50 mg, Oral, TID PRN, Steffanie Cunha MD    traZODone (DESYREL) tablet 50 mg, 50 mg, Oral, QHS PRN, Steffanie Cunha MD, 50 mg at 12/16/22 2056    acetaminophen (TYLENOL) tablet 650 mg, 650 mg, Oral, Q4H PRN, Joelle Perez MD    magnesium hydroxide (MILK OF MAGNESIA) 400 mg/5 mL oral suspension 30 mL, 30 mL, Oral, DAILY PRN, Steffanie Cunha MD, 30 mL at 12/19/22 1940    OLANZapine (ZyPREXA) tablet 10 mg, 10 mg, Oral, Q8H PRN, Joelle Perez MD, 10 mg at 12/17/22 1650    OLANZapine (ZyPREXA) injection 10 mg, 10 mg, IntraMUSCular, Q8H PRN, Steffanie Cunha MD, 10 mg at 12/16/22 1117    LORazepam (ATIVAN) tablet 1 mg, 1 mg, Oral, Q6H PRN, Joelle Perez MD    LORazepam (ATIVAN) injection 1 mg, 1 mg, IntraMUSCular, Q6H PRN, Steffanie Cunha MD, 1 mg at 12/20/22 1020   Electronically signed by Imani Christianson MD on 12/20/2022 at 11:40 PM

## 2022-12-22 PROCEDURE — 65220000001 HC RM PRIVATE PSYCH

## 2022-12-22 PROCEDURE — 74011250637 HC RX REV CODE- 250/637: Performed by: PSYCHIATRY & NEUROLOGY

## 2022-12-22 RX ADMIN — DIVALPROEX SODIUM 1000 MG: 500 TABLET, DELAYED RELEASE ORAL at 21:03

## 2022-12-22 RX ADMIN — RISPERIDONE 2 MG: 2 TABLET ORAL at 08:59

## 2022-12-22 RX ADMIN — RISPERIDONE 2 MG: 2 TABLET ORAL at 21:03

## 2022-12-22 RX ADMIN — DIVALPROEX SODIUM 500 MG: 500 TABLET, DELAYED RELEASE ORAL at 08:58

## 2022-12-22 NOTE — GROUP NOTE
IP  GROUP DOCUMENTATION INDIVIDUAL                                                                          Group Therapy Note    Date: 12/22/2022    Group Start Time: 1115  Group End Time: 1155  Group Topic: Education Group - Inpatient    SRM 2 BH NON ACUTE    Diamond Snellen    IP  GROUP DOCUMENTATION GROUP    Group Therapy Note    Facilitated group to introduce information relating to  the grieving process / consisting of different types of grief, the symptoms and coping strategies    Attendees: 6/7       Attendance: Attended    Patient's Goal:  Attend group daily     Interventions/techniques: Informed and Supported    Follows Directions: Followed directions    Interactions: Interacted appropriately    Mental Status: Calm    Behavior/appearance: Cooperative    Goals Achieved: Able to engage in interactions, Able to listen to others, Able to reflect/comment on own behavior, and Able to self-disclose      Additional Notes:  Receptive to information discussed and engaged.   Was able to share Jarad Eller has been dealing with a lot of different losses\"    Royce Cobos

## 2022-12-22 NOTE — GROUP NOTE
IP  GROUP DOCUMENTATION INDIVIDUAL                                                                          Group Therapy Note    Date: 12/22/2022    Group Start Time: 2375  Group End Time: 1620  Group Topic: Recreational/Music Therapy    SRM 2  NON ACUTE    Ahmet Phillips    IP [facility] GROUP DOCUMENTATION GROUP    Group Therapy Note    Facilitated leisure skills group to reinforce positive coping and to manage mood through music, social interaction, group activities and art task    Attendees: 8/12       Attendance: Attended    Patient's Goal:  Attend group daily     Interventions/techniques: Art integration and Supported    Follows Directions: Followed directions    Interactions: Interacted appropriately    Mental Status: Calm    Behavior/appearance: Cooperative    Goals Achieved: Able to engage in interactions and Able to listen to others      Additional Notes:  Receptive to listening to music and a song she selected while working on leisure task.  Interacted with peer and staff when prompted    CORNELL Boothe

## 2022-12-22 NOTE — BH NOTES
Nurse Note:    Patient is isolative to the room this shift; presents as  pleasant and cooperative. Patient denies SI, HI, A/V hallucinations. Denies anxiety and depression. Patient has delusional thoughts and states, \"When I called in my claim the people there told me to kill myself\". Patient denies pain and/or discomfort. Patient is medication compliant. C/o gas and was given Prn. Prn medication effective with no further report of flatulence. Reports eating meals and adequate sleep. Will continue to monitor for safety.

## 2022-12-22 NOTE — GROUP NOTE
IP  GROUP DOCUMENTATION INDIVIDUAL                                                                          Group Therapy Note    Date: 12/22/2022    Group Start Time: 1320  Group End Time: 1400  Group Topic: Process Group - Inpatient    SRM 2 BEHA HLTH ACUTE    Karol Donta    IP [facility] GROUP DOCUMENTATION GROUP    Group Therapy Note  During process group writer used the the empowerment cards to explore the pts thoughts on self-awareness, confidence and goals. Pts picked a card from each category and shared it with the group. Pts interacted well with others and provided positive feedback. One of the pts appeared to be thankful for the groups encouragement as he was observed tearing up and giving a hug. Attendees: 4-9       Attendance: Attended    Patient's Goal:  To attend group and participate in activities     Interventions/techniques: Informed, Validated, Provide feedback, and Supported    Follows Directions: Followed directions    Interactions: Interacted appropriately    Mental Status: Calm and Flat    Behavior/appearance: Attentive, Cooperative, and Motivated    Goals Achieved: Able to listen to others, Able to give feedback to another, Able to reflect/comment on own behavior, Able to receive feedback, Able to self-disclose, and Discussed coping      Additional Notes:  Pt was quiet and withdrawn but when asked directly pt would share. She shared that she does not know how to feel with everything that has happened. Pt said it \"doesn't feel real but I know it is\". She said that her one goal is to be with the family she has on [place].      Welby Scheuermann

## 2022-12-22 NOTE — PROGRESS NOTES
Problem: Falls - Risk of  Goal: *Absence of Falls  Description: Document Clydene Bone Fall Risk and appropriate interventions in the flowsheet.   Outcome: Progressing Towards Goal  Note: Fall Risk Interventions:            Medication Interventions: Teach patient to arise slowly                   Problem: Risk for Elopement  Goal: Patient will not exit the unit/facility without proper escort  Outcome: Progressing Towards Goal

## 2022-12-22 NOTE — GROUP NOTE
[facility] GROUP DOCUMENTATION INDIVIDUAL                                                                          Group Therapy Note    Date: 12/22/2022    Group Start Time: 1430  Group End Time: 9914  Group Topic: Education Group - Inpatient    SRM 2 BEHA HLTH ACUTE    [address]    [facility] GROUP DOCUMENTATION GROUP    Group Therapy Note: Facilitator engaged the group in facts about marijuana and discussed the meaning of psychosis, baseline and the importance of med compliance. Attendees: 6       Attendance: Attended    Patient's Goal:  To attend groups    Interventions/techniques: Informed, Validated, and Supported    Follows Directions: Followed directions    Interactions: Interacted appropriately    Mental Status: Calm, Flat, and Worried    Behavior/appearance: Attentive, Cooperative, Disheveled, Poor eye contact, and Withdrawn/quiet    Goals Achieved: Able to engage in interactions, Able to listen to others, and Able to self-disclose      Additional Notes:  Concerned about discharging. Informed tx team discussed possible d/c Friday. Trying to get in touch with Nephew. Pt was relieved.      Glenn De Los Santos

## 2022-12-22 NOTE — PROGRESS NOTES
Progress Note  Date:2022       Room:[facility]  Patient Name:Liss Stevens     YOB: 1975     Age:47 y.o. Subjective    Subjective patient continues to present disorganized, with loose associations and get fixated on disability, getting checks from [employer],. She was explained by case management relating to her situations at home where she lost her mom and other family member      But she presents without any emotions attaching to her loss of her mom and other family member. Insight remains impaired judgment limited. No active SI or HI voiced. Review of Systems  Objective         Vitals Last 24 Hours:  TEMPERATURE:  Temp  Av.3 °F (36.8 °C)  Min: 97.8 °F (36.6 °C)  Max: 98.7 °F (37.1 °C)  RESPIRATIONS RANGE: Resp  Av  Min: 18  Max: 18  PULSE OXIMETRY RANGE: SpO2  Av.5 %  Min: 99 %  Max: 100 %  PULSE RANGE: Pulse  Av  Min: 88  Max: 100  BLOOD PRESSURE RANGE: Systolic (02LKK), WPV:425 , Min:102 , ZRA:279   ; Diastolic (94AMD), EEE:43, Min:74, Max:77    I/O (24Hr): No intake or output data in the 24 hours ending 22 2150  Objective  Labs/Imaging/Diagnostics    Labs:  CBC:No results for input(s): WBC, RBC, HGB, HCT, MCV, RDW, PLT, HGBEXT, HCTEXT, PLTEXT in the last 72 hours. CHEMISTRIES:No results for input(s): NA, K, CL, CO2, BUN, CA, PHOS, MG in the last 72 hours. No lab exists for component: CREATININE, GLUCOSEPT/INR:No results for input(s): INR, INREXT in the last 72 hours. No lab exists for component: PROTIME  APTT:No results for input(s): APTT in the last 72 hours. LIVER PROFILE:No results for input(s): AST, ALT in the last 72 hours. No lab exists for component: Jose Inch, ALKPHOS  Lab Results   Component Value Date/Time    ALT (SGPT) 41 12/15/2022 07:01 AM    AST (SGOT) 11 (L) 12/15/2022 07:01 AM    Alk.  phosphatase 94 12/15/2022 07:01 AM    Bilirubin, direct 0.1 10/31/2012 08:25 AM    Bilirubin, total 0.2 12/15/2022 07:01 AM       Imaging Last 24 Hours:  No results found. Assessment//Plan   Active Problems:    Psychosis ([facility] [address] (12/15/2022)      Bipolar disorder with psychotic features ([facility] [address] (12/15/2022)      Assessment & Plan  Continue Risperdal 2 mg p.o. twice daily  No dystonic symptoms reported provided support  Focused on getting her workmen's compensation, disability,    Family meeting  Tolerating increased Depakote  Increased group therapy  Patient refused long-acting decanoate.       Current Facility-Administered Medications:     risperiDONE (RisperDAL) tablet 2 mg, 2 mg, Oral, BID, Joelle Perez MD, 2 mg at 12/21/22 2111    divalproex DR (DEPAKOTE) tablet 500 mg, 500 mg, Oral, DAILY, Joelle Perez MD, 500 mg at 12/21/22 0932    divalproex DR (DEPAKOTE) tablet 1,000 mg, 1,000 mg, Oral, QHS, Joelle Perez MD, 1,000 mg at 12/21/22 2111    alum-mag hydroxide-simeth (MYLANTA) oral suspension 30 mL, 30 mL, Oral, QID PRN, Steffanie Cunha MD, 30 mL at 12/21/22 2121    hydrOXYzine HCL (ATARAX) tablet 50 mg, 50 mg, Oral, TID PRN, Steffanie Cunha MD    traZODone (DESYREL) tablet 50 mg, 50 mg, Oral, QHS PRN, Steffanie Cunha MD, 50 mg at 12/16/22 2056    acetaminophen (TYLENOL) tablet 650 mg, 650 mg, Oral, Q4H PRN, Joelle Perez MD    magnesium hydroxide (MILK OF MAGNESIA) 400 mg/5 mL oral suspension 30 mL, 30 mL, Oral, DAILY PRN, Joelle Perez MD, 30 mL at 12/19/22 1940    OLANZapine (ZyPREXA) tablet 10 mg, 10 mg, Oral, Q8H PRN, Joelle Perez MD, 10 mg at 12/17/22 1650    OLANZapine (ZyPREXA) injection 10 mg, 10 mg, IntraMUSCular, Q8H PRN, Joelle Perez MD, 10 mg at 12/16/22 1117    LORazepam (ATIVAN) tablet 1 mg, 1 mg, Oral, Q6H PRN, Joelle Perez MD    LORazepam (ATIVAN) injection 1 mg, 1 mg, IntraMUSCular, Q6H PRN, Steffanie Cunha MD, 1 mg at 12/20/22 1020   Electronically signed by Gunjan Li MD on 12/21/2022 at 9:50 PM

## 2022-12-22 NOTE — GROUP NOTE
IP  GROUP DOCUMENTATION INDIVIDUAL                                                                          Group Therapy Note    Date: 12/21/2022    Group Start Time: 1945  Group End Time: 2030  Group Topic: Recreational/Music Therapy    SRM 2  NON ACUTE    Aisha Verma    IP [facility] GROUP DOCUMENTATION GROUP    Group Therapy Note    Attendees: 1/6    Facilitated structured exercise group to introduce healthy leisure skills as positive way to cope and manage mood.         Attendance: Did not attend    Additional Notes:  Did not attend group despite encouragement    Carloz Westfall, CTRS

## 2022-12-22 NOTE — PROGRESS NOTES
Progress Note  Date:2022       Room:[facility]  Patient Name:Liss Stevens     YOB: 1975     Age:47 y.o. Subjective    Subjective   Patient has been compliant with her medication. At times she did looks quiet and sad but when asked she is more focused on wanting to go home. She does not share much about the loss of her family members but when reiterated she does report she needs to be with her family at [place] and to dealing with a  \"different situation\"    Mental status examination-patient is alert and oriented to name place person still lose associations tangential thought processes fixated on Worker's Compensation how others are leading their life and have she cannot have a happy life and disability checks and wanting to go to apply for a new job. Insight remains impaired but noted some improvement no active SI or HI voiced no command hallucinations to harm herself or others no persecutory delusions    Reported baseline loose associations and tangential and delusional fixation. Review of Systems  Objective         Vitals Last 24 Hours:  TEMPERATURE:  Temp  Av.4 °F (36.9 °C)  Min: 98 °F (36.7 °C)  Max: 98.7 °F (37.1 °C)  RESPIRATIONS RANGE: Resp  Av  Min: 18  Max: 18  PULSE OXIMETRY RANGE: SpO2  Av %  Min: 99 %  Max: 99 %  PULSE RANGE: Pulse  Av.5  Min: 89  Max: 100  BLOOD PRESSURE RANGE: Systolic (49HMW), XAV:959 , Min:120 , VUH:821   ; Diastolic (20OQQ), DEONNA:06, Min:72, Max:77    I/O (24Hr): No intake or output data in the 24 hours ending 227  Objective  Labs/Imaging/Diagnostics    Labs:  CBC:No results for input(s): WBC, RBC, HGB, HCT, MCV, RDW, PLT, HGBEXT, HCTEXT, PLTEXT in the last 72 hours. CHEMISTRIES:No results for input(s): NA, K, CL, CO2, BUN, CA, PHOS, MG in the last 72 hours. No lab exists for component: CREATININE, GLUCOSEPT/INR:No results for input(s): INR, INREXT in the last 72 hours.     No lab exists for component: PROTIME  APTT:No results for input(s): APTT in the last 72 hours. LIVER PROFILE:No results for input(s): AST, ALT in the last 72 hours. No lab exists for component: GUILLAUME Chance  Lab Results   Component Value Date/Time    ALT (SGPT) 41 12/15/2022 07:01 AM    AST (SGOT) 11 (L) 12/15/2022 07:01 AM    Alk. phosphatase 94 12/15/2022 07:01 AM    Bilirubin, direct 0.1 10/31/2012 08:25 AM    Bilirubin, total 0.2 12/15/2022 07:01 AM       Imaging Last 24 Hours:  No results found.   Assessment//Plan   Active Problems:    Psychosis ([facility] (12/15/2022)      Bipolar disorder with psychotic features ([facility] (12/15/2022)      Assessment & Plan  We will schedule Risperdal Consta 50 mg IM on 12/23/2022  Continue to explore stepdown plan to family if that would help to continue to heal her loss         Current Facility-Administered Medications:     risperiDONE (RisperDAL) tablet 2 mg, 2 mg, Oral, BID, Joelle Perez MD, 2 mg at 12/22/22 0859    divalproex  (DEPAKOTE) tablet 500 mg, 500 mg, Oral, DAILY, Joelle Perez MD, 500 mg at 12/22/22 0858    divalproex  (DEPAKOTE) tablet 1,000 mg, 1,000 mg, Oral, QHS, Joelle Perez MD, 1,000 mg at 12/21/22 2111    alum-mag hydroxide-simeth (MYLANTA) oral suspension 30 mL, 30 mL, Oral, QID PRN, Robert Franz MD, 30 mL at 12/21/22 2121    hydrOXYzine HCL (ATARAX) tablet 50 mg, 50 mg, Oral, TID PRN, Robert Franz MD    traZODone (DESYREL) tablet 50 mg, 50 mg, Oral, QHS PRN, Robert Franz MD, 50 mg at 12/16/22 2056    acetaminophen (TYLENOL) tablet 650 mg, 650 mg, Oral, Q4H PRN, Joelle Perez MD    magnesium hydroxide (MILK OF MAGNESIA) 400 mg/5 mL oral suspension 30 mL, 30 mL, Oral, DAILY PRN, Joelle Perez MD, 30 mL at 12/19/22 1940    OLANZapine (ZyPREXA) tablet 10 mg, 10 mg, Oral, Q8H PRN, Joelle Perez MD, 10 mg at 12/17/22 1650    OLANZapine (ZyPREXA) injection 10 mg, 10 mg, IntraMUSCular, Q8H PRN, Robert Franz MD, 10 mg at 12/16/22 1117    LORazepam (ATIVAN) tablet 1 mg, 1 mg, Oral, Q6H PRN, Steffanie Cunha MD    LORazepam (ATIVAN) injection 1 mg, 1 mg, IntraMUSCular, Q6H PRN, Steffanie Cunha MD, 1 mg at 12/20/22 1020   Electronically signed by Gunjan Li MD on 12/22/2022 at 6:37 PM

## 2022-12-22 NOTE — BH NOTES
Pt.is up and visible on unit, affect is flat,appetite good appearance is disheveled. Denies feeling suicidal or depressed,pt. remains delusional making statements of grandeur concerning her being rich. Also is asking to go home today,states she has a  to go to,mood is labile. denies hallucinations. Disorganized at times,no other concerns voiced, remains on close observation.

## 2022-12-22 NOTE — BH NOTES
Behavioral Health Treatment Team Note      Patient goal(s) for today: \"to go home\"  Treatment team focus/goals: continue medication management, group therapy and provide a safe discharge     Progress note: Pt presented with a flat, detached affect and \"hurt\" mood. Pt denied any SI/HI/AVH at the time and was oriented x4. Pts thoughts were logical and clear when speaking about her discharge. She said that she would like to be with family for [place] because \"it's going to be the worse one of my life\". She said \"I don't know how I feel because it doesn't feel real\". Pt and writer spoke about grief and how it can affect emotions and feelings. Pt also stated that she understands that she will be staying with Geri Nunez for a short period of time because he has a \"life and is a busy man\". Pt would like to be connected with further services to help her connect with resources and therapy. When speaking about the long acting pt thought that she was a \"test dummy\". Writer educated the pt on the long acting injection and how it has been tested and already approved, she said that she will think about it. An inpatient level of care is needed to provide a safe discharge. Collateral call with Jake Chung spoke with Geri Nunez and he said that he does not know if they can \"handle his aunt right now\" due to trying to plan two funerals and one family member on trial. He appeared to be supported and overwhelmed with his family situation. Writer validated Harjinder's feelings and worries but informed him that the hospital is a short term treatment and pt does not meet criteria at this time. Geri Nunez shared that he will talk to his uncle and call the writer back.      LOS:  7  Expected LOS: Tdo until 12.28    Insurance info/prescription coverage:  Medicare  Date of last family contact:  12.22.22  Family requesting physician contact today:  no  Discharge plan:  Medication stabilization  Guns in the home:  no   Outpatient provider(s): [id number] [facility]    Participating treatment team members: Vanesa Medrano, * (assigned SW), Kristal Pina LMSW

## 2022-12-23 PROCEDURE — 74011250636 HC RX REV CODE- 250/636: Performed by: PSYCHIATRY & NEUROLOGY

## 2022-12-23 PROCEDURE — 74011250637 HC RX REV CODE- 250/637: Performed by: PSYCHIATRY & NEUROLOGY

## 2022-12-23 PROCEDURE — 65220000001 HC RM PRIVATE PSYCH

## 2022-12-23 RX ORDER — RISPERIDONE 2 MG/1
2 TABLET, FILM COATED ORAL 2 TIMES DAILY
Qty: 60 TABLET | Refills: 1 | Status: SHIPPED | OUTPATIENT
Start: 2022-12-23

## 2022-12-23 RX ORDER — DIVALPROEX SODIUM 500 MG/1
500 TABLET, DELAYED RELEASE ORAL DAILY
Qty: 30 TABLET | Refills: 1 | Status: SHIPPED | OUTPATIENT
Start: 2022-12-24

## 2022-12-23 RX ORDER — TRAZODONE HYDROCHLORIDE 50 MG/1
50 TABLET ORAL
Qty: 30 TABLET | Refills: 1 | Status: SHIPPED | OUTPATIENT
Start: 2022-12-23

## 2022-12-23 RX ORDER — DIVALPROEX SODIUM 500 MG/1
1000 TABLET, DELAYED RELEASE ORAL
Qty: 60 TABLET | Refills: 1 | Status: SHIPPED | OUTPATIENT
Start: 2022-12-23

## 2022-12-23 RX ADMIN — DIVALPROEX SODIUM 1000 MG: 500 TABLET, DELAYED RELEASE ORAL at 21:43

## 2022-12-23 RX ADMIN — RISPERIDONE 50 MG: KIT at 11:31

## 2022-12-23 RX ADMIN — RISPERIDONE 2 MG: 2 TABLET ORAL at 10:35

## 2022-12-23 RX ADMIN — RISPERIDONE 2 MG: 2 TABLET ORAL at 21:43

## 2022-12-23 RX ADMIN — DIVALPROEX SODIUM 500 MG: 500 TABLET, DELAYED RELEASE ORAL at 10:34

## 2022-12-23 NOTE — PROGRESS NOTES
Problem: Falls - Risk of  Goal: *Absence of Falls  Description: Document Juanito Kitchen Fall Risk and appropriate interventions in the flowsheet.   Outcome: Progressing Towards Goal  Note: Fall Risk Interventions:            Medication Interventions: Teach patient to arise slowly

## 2022-12-23 NOTE — BH NOTES
Behavioral Health Treatment Team Note     Patient goal(s) for today: \"I want to go home\"  Treatment team focus/goals: continue medication management, group therapy and provide a safe discharge    Progress note: Pt presented with a sad, upset affect and congruent mood. Pt denied SI/HI/AVH at the time and is oriented x3. She was standing in the soto and started to cry talking about her mothers . She said that Gale Minus are trying to keep me away from the \". Pt wants to go to a hotel and is demanding to leaving. Pt lacks the capacity to be on her own in a hotel. An inpatient level of care is needed to coordinate a safe discharge.     LOS:  8  Expected LOS: TDO until     Insurance info/prescription coverage:  Medicare  Date of last family contact:  22  Family requesting physician contact today:  no  Discharge plan:  to step down with family  Guns in the home:  no   Outpatient provider(s):  [facility]    Participating treatment team members: Susana Blackmon, * (assigned SW), [facility], [facility] Statement Selected

## 2022-12-23 NOTE — GROUP NOTE
IP  GROUP DOCUMENTATION INDIVIDUAL                                                                          Group Therapy Note    Date: 12/23/2022    Group Start Time: 8791  Group End Time: 0764  Group Topic: Education Group - Inpatient    SRM 2  NON ACUTE    Karine Calderon    IP [facility] GROUP DOCUMENTATION GROUP    Group Therapy Note    Setting Goals/Facilitated discussion focused on stepping up to a better you by taking steps to improve in their well-being and identifying goals that would help to ensure follow-up    Attendees: 4/12       Attendance: Did not attend    Additional Notes:  Encouraged but did not attend    CORNELL Bonilla

## 2022-12-23 NOTE — BH NOTES
Pt up ad michelle on unit pushing for discharge.  and  aware and discharge orders places, but cannot coordinate safe discharge at this time due to family not returning phone calls. Pt has been compliant with scheduled medication and received risperdal consta 50mg IM this morning at 1131 in right deltoid. Pt discouraged about not being discharged and tried to arrange taxi cabs by calling other floors in the hospital. Pt has remained isolative to room since this afternoon. Pt denies SI/HI/AVH. Pt reports anxiety 5/10 and depression 0/10. Anxiety is related to not receiving her money from travelers insurance and wanting to be able to coordinate  services for her family.
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
## 2022-12-23 NOTE — BH NOTES
Nurse Note:    Patient observed resting quietly and in the hallway briefly to receive personal hygiene items. No report of anxiety or depression. Denies SI, HI, A/V hallucinations; continues to have delusional thoughts. No report of anxiety or depression. Patient is medication compliant. Reports eating meals and \"ok\" sleep. Patient declines medication for insomnia and is observed resting quietly during the shift. Observed at this time resting quietly; will continue to monitor for safety.

## 2022-12-24 PROCEDURE — 74011250637 HC RX REV CODE- 250/637: Performed by: PSYCHIATRY & NEUROLOGY

## 2022-12-24 PROCEDURE — 65220000001 HC RM PRIVATE PSYCH

## 2022-12-24 RX ADMIN — DIVALPROEX SODIUM 1000 MG: 500 TABLET, DELAYED RELEASE ORAL at 21:25

## 2022-12-24 RX ADMIN — OLANZAPINE 10 MG: 10 TABLET, FILM COATED ORAL at 17:28

## 2022-12-24 RX ADMIN — DIVALPROEX SODIUM 500 MG: 500 TABLET, DELAYED RELEASE ORAL at 08:41

## 2022-12-24 RX ADMIN — RISPERIDONE 2 MG: 2 TABLET ORAL at 21:25

## 2022-12-24 RX ADMIN — RISPERIDONE 2 MG: 2 TABLET ORAL at 08:40

## 2022-12-24 RX ADMIN — LORAZEPAM 1 MG: 1 TABLET ORAL at 17:28
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
## 2022-12-24 NOTE — GROUP NOTE
IP  GROUP DOCUMENTATION INDIVIDUAL                                                                          Group Therapy Note    Date: 12/24/2022    Group Start Time: 4549  Group End Time: 1100  Group Topic: Education Group - Inpatient    SRM 2 BH NON ACUTE    Donta Lau    IP [facility] GROUP DOCUMENTATION GROUP    Group Therapy Note    Attendees: 5/12  Facilitated structured group to identify triggers, impact of triggers on mental health, and positive ways to manage triggers. Attendance: Attended    Patient's Goal:  STG:Attend activities and groups    Interventions/techniques: Informed, Provide feedback, and Supported    Follows Directions: Followed directions    Interactions: Interacted appropriately with staff and peers    Mental Status: Preoccupied    Behavior/appearance: Cooperative    Goals Achieved: Able to listen to others      Additional Notes:  PT attended group and was able to identify personal triggers. PT required prompts and encouragement and redirected to topic at times. Pt in and out of group.      Rhodia Apley, CTRS

## 2022-12-24 NOTE — GROUP NOTE
IP  GROUP DOCUMENTATION INDIVIDUAL                                                                          Group Therapy Note    Date: 12/24/2022    Group Start Time: 0290  Group End Time: 1400  Group Topic: Recreational/Music Therapy    SRM 2  NON ACUTE    Adriana Esposito    IP [address] [facility] GROUP DOCUMENTATION GROUP    Group Therapy Note    Attendees: 6/12    Facilitated structured leisure skills group to introduce healthy leisure skills as positive way to cope and manage mood. Attendance: Attended    Patient's Goal:  Anxiety reduced or absent      Interventions/techniques: Art integration and Supported    Follows Directions: Followed directions    Interactions: Interacted appropriately    Mental Status: Calm    Behavior/appearance: Attentive    Goals Achieved: Able to engage in interactions and Able to listen to others      Additional Notes: Attended group and listened to songs with peers. Was receptive to intervention and responded to prompts from staff. Attended entire session and was attentive during group.      Zohreh Donato, BRENNONS
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
## 2022-12-25 PROCEDURE — 65220000001 HC RM PRIVATE PSYCH

## 2022-12-25 PROCEDURE — 74011250637 HC RX REV CODE- 250/637: Performed by: PSYCHIATRY & NEUROLOGY

## 2022-12-25 RX ORDER — SIMETHICONE 80 MG
80 TABLET,CHEWABLE ORAL
Status: DISCONTINUED | OUTPATIENT
Start: 2022-12-25 | End: 2022-12-25

## 2022-12-25 RX ORDER — SIMETHICONE 80 MG
80 TABLET,CHEWABLE ORAL
Status: DISCONTINUED | OUTPATIENT
Start: 2022-12-25 | End: 2022-12-31 | Stop reason: HOSPADM

## 2022-12-25 RX ADMIN — RISPERIDONE 2 MG: 2 TABLET ORAL at 21:26

## 2022-12-25 RX ADMIN — RISPERIDONE 2 MG: 2 TABLET ORAL at 08:39

## 2022-12-25 RX ADMIN — DIVALPROEX SODIUM 1000 MG: 500 TABLET, DELAYED RELEASE ORAL at 21:26

## 2022-12-25 RX ADMIN — DIVALPROEX SODIUM 500 MG: 500 TABLET, DELAYED RELEASE ORAL at 08:39

## 2022-12-25 NOTE — PROGRESS NOTES
Problem: Falls - Risk of  Goal: *Absence of Falls  Description: Document Clydene Bone Fall Risk and appropriate interventions in the flowsheet. Outcome: Progressing Towards Goal  Note: Fall Risk Interventions:    Medication Interventions: Teach patient to arise slowly    Problem: Psychosis  Goal: *STG: Remains safe in hospital  Outcome: Progressing Towards Goal     Problem: Psychosis  Goal: *STG/LTG: Complies with medication therapy  Outcome: Progressing Towards Goal     Patient has not fallen so far this shift. Patient has been safe so far this shift. Patient was medication compliant. Patient remains on close observation. Patient has been alert, oriented, cooperative and pleasant. Patient has mostly been in her room. She said she is frustrated about being in the hospital for so long. She said she is confused and uninformed about her discharge plans. She said she would go to a hotel and pay for it. She denied SI or HI. She admitted to slight depression. Medication compliant. Patient was encouraged to ensure that the people involved in her discharge plans are communicating and are clear with each other. Since going to bed, patient has been mostly asleep. When awake she talks about her discharge desires.   She agreed to stay tonight and discuss this further in the AM.
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
## 2022-12-25 NOTE — GROUP NOTE
[facility] GROUP DOCUMENTATION INDIVIDUAL                                                                          Group Therapy Note    Date: 12/25/2022    Group Start Time: 8811  Group End Time: 1400  Group Topic: Education Group - Inpatient    SRM 2  NON ACUTE    Parish Ledesma    IP [facility] GROUP DOCUMENTATION GROUP    Group Therapy Note    Attendees: 7/12    Facilitated structured leisure skills group to introduce healthy leisure skills as positive way to cope and manage mood. Attendance: Attended    Patient's Goal:  STG: Attend activities and groups    Interventions/techniques: Art integration and Supported    Follows Directions: Followed directions    Interactions: Interacted appropriately    Mental Status: Calm    Behavior/appearance: Attentive    Goals Achieved: Able to engage in interactions and Able to listen to others      Additional Notes: Attended group and selected songs to listen to with peers. Receptive to intervention and responded to prompts in group.      Gino Velazquez, BRENNONS

## 2022-12-25 NOTE — PROGRESS NOTES
Problem: Psychosis  Goal: *STG: Decreased delusional thinking  Outcome: Progressing Towards Goal     Problem: Psychosis  Goal: *STG: Seeks staff when feelings of self harm or harm towards others arise  Outcome: Progressing Towards Goal     Problem: Psychosis  Goal: *STG: Patient will verbalize areas in need of boundary recognition and limit setting  Outcome: Progressing Towards Goal     Problem: Psychosis  Goal: *STG: Accept constructive criticism without injury or isolation  Outcome: Progressing Towards Goal

## 2022-12-25 NOTE — BH NOTES
Pt is alert and oriented to person and place. Affect and mood remain flat and constricted. Pt continues to lack appropriate insight into the reasons why she cannot be released from the hospital without having someone agreeable and available to assist her given she has lost her housing and all her belongings. Denies SI/HI or AVH. Medication and meal compliant. Writer received phone call from a women who stated she is Liss's oldest sister and the mother of Maribell Tomas. After speaking with the patient, this person wanted to know when she was being discharged and what \"the hospital\" is doing to assist the patient with housing and other living needs, also stated the  was asking if pt needed  lodging and assistance with clothing etc. Further stated that initially she declined because she was told patient was int the hospital and unaware of her needs. Informed caller that it is unknown if patient has completed CY and sent call to [facility], [facility], . Voice mail. This caller indicated pt could come stay with her and get clothing needed for her mother's . This caller also admitted to speaking to Maribell Tomas briefly on  and was told the patient would be d/c on . Number called from [phone].

## 2022-12-25 NOTE — GROUP NOTE
IP  GROUP DOCUMENTATION INDIVIDUAL                                                                          Group Therapy Note    Date: 12/25/2022    Group Start Time: 0995  Group End Time: 1100  Group Topic: Education Group - Inpatient    SRM 2 BH NON ACUTE    Leyla Garcia    [facility] GROUP DOCUMENTATION GROUP    Group Therapy Note    Attendees: 7/12    Facilitated group discussion on Common Reactions to Trauma and to identify Stress Risk Factors       Attendance: Attended    Patient's Goal:  STG: Activities and groups    Interventions/techniques: Challenged, Promoted peer support, and Supported    Follows Directions: Minimal participation    Interactions: Other minimal engagement    Mental Status: Flat    Behavior/appearance: Needed prompting    Goals Achieved: Able to engage in interactions and Able to listen to others      Additional Notes: Attended group and was receptive to intervention and participated in discussion with cues and encouragement. PT was able to assess personal risk factors and identify needs for change in managing stressors. PT reports she is ready to leave hospital  and would like to call a cab to get her picked up.      Darshana Price, CTRS

## 2022-12-25 NOTE — PROGRESS NOTES
52years old ptosis, bipolar disease, hyperlipidemia, low back pain,`  Visit Vitals  /70   Pulse (!) 117   Temp 98.5 °F (36.9 °C)   Resp 18   SpO2 97%       Current Facility-Administered Medications:     risperiDONE (RisperDAL) tablet 2 mg, 2 mg, Oral, BID, Joelle Perez MD, 2 mg at 12/25/22 5667    divalproex  (DEPAKOTE) tablet 500 mg, 500 mg, Oral, DAILY, Joelle Perez MD, 500 mg at 12/25/22 6072    divalproex DR (DEPAKOTE) tablet 1,000 mg, 1,000 mg, Oral, QHS, Joelle Perez MD, 1,000 mg at 12/24/22 2125    alum-mag hydroxide-simeth (MYLANTA) oral suspension 30 mL, 30 mL, Oral, QID PRN, Carine Vera MD, 30 mL at 12/21/22 2121    hydrOXYzine HCL (ATARAX) tablet 50 mg, 50 mg, Oral, TID PRN, Carine Vera MD    traZODone (DESYREL) tablet 50 mg, 50 mg, Oral, QHS PRN, Carine Vera MD, 50 mg at 12/16/22 2056    acetaminophen (TYLENOL) tablet 650 mg, 650 mg, Oral, Q4H PRN, Joelle Perez MD    magnesium hydroxide (MILK OF MAGNESIA) 400 mg/5 mL oral suspension 30 mL, 30 mL, Oral, DAILY PRN, Joelle Perez MD, 30 mL at 12/19/22 1940    OLANZapine (ZyPREXA) tablet 10 mg, 10 mg, Oral, Q8H PRN, Joelle Perez MD, 10 mg at 12/24/22 1728    OLANZapine (ZyPREXA) injection 10 mg, 10 mg, IntraMUSCular, Q8H PRN, Joelle Perez MD, 10 mg at 12/16/22 1117    LORazepam (ATIVAN) tablet 1 mg, 1 mg, Oral, Q6H PRN, Joelle Perez MD, 1 mg at 12/24/22 1728    LORazepam (ATIVAN) injection 1 mg, 1 mg, IntraMUSCular, Q6H PRN, Joelle Perez MD, 1 mg at 12/20/22 1021

## 2022-12-26 PROCEDURE — 74011250637 HC RX REV CODE- 250/637: Performed by: PSYCHIATRY & NEUROLOGY

## 2022-12-26 PROCEDURE — 65220000001 HC RM PRIVATE PSYCH

## 2022-12-26 RX ADMIN — DIVALPROEX SODIUM 1000 MG: 500 TABLET, DELAYED RELEASE ORAL at 21:48

## 2022-12-26 RX ADMIN — RISPERIDONE 2 MG: 2 TABLET ORAL at 08:53

## 2022-12-26 RX ADMIN — RISPERIDONE 2 MG: 2 TABLET ORAL at 21:48

## 2022-12-26 RX ADMIN — DIVALPROEX SODIUM 500 MG: 500 TABLET, DELAYED RELEASE ORAL at 08:53

## 2022-12-26 NOTE — BH NOTES
Behavioral Health Treatment Team Note     Patient goal(s) for today: Pt reports \"To go home. \"  Treatment team focus/goals: medication management, group therapy, safe discharge planning    Progress note: Pt was observed sitting up in bed. Pt anxious. Pt fixated on going to [employer] for her bank card and getting a hotel. Writer attempted to process with pt about an appropriate discharge plan. Pt stated \"It's not a good idea\" when writer inquired about staying with sister. Pt denies SI, HI, AVH. An inpatient level of care is needed in order to coordinate a safe discharge plan.      LOS:  11  Expected LOS: TDO until 12/28    Insurance info/prescription coverage:  Medicare  Date of last family contact:  12/24/22- Attempted contact with nephewJeb requesting physician contact today:  no  Discharge plan:  step down with family  Guns in the home:  no   Outpatient provider(s):  Hamida Walls    Participating treatment team members: Vanesa Medrano, * (assigned SW), Kingsley Warner, [id number]
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
## 2022-12-26 NOTE — GROUP NOTE
IP  GROUP DOCUMENTATION INDIVIDUAL                                                                          Group Therapy Note    Date: 12/26/2022    Group Start Time: 1110  Group End Time: 6896  Group Topic: Education Group - Inpatient    SRM 2  NON ACUTE    Persaudcorine Robledo    IP [address] [facility] GROUP DOCUMENTATION GROUP    Group Therapy Note    Facilitated group to introduce information on the cognitive triangle and discuss how thoughts about a situation, emotions and behaviors affect one another    Attendees: 8/12       Attendance: Attended    Patient's Goal:  Attend group daily     Interventions/techniques: Informed and Supported    Follows Directions: Followed directions    Interactions: Interacted appropriately    Mental Status: Calm    Behavior/appearance: Cooperative and Needed prompting    Goals Achieved: Able to engage in interactions and Able to listen to others      Additional Notes:  Came to group late. Receptive to information discussed.  Did not share any information    Broderick Spurling, [address]

## 2022-12-26 NOTE — GROUP NOTE
IP  GROUP DOCUMENTATION INDIVIDUAL                                                                          Group Therapy Note    Date: 12/26/2022    Group Start Time: 2374  Group End Time: 6271  Group Topic: Recreational/Music Therapy    SRM 2  NON ACUTE    Martine Laser    IP [address] [facility] GROUP DOCUMENTATION GROUP    Group Therapy Note    Facilitated leisure skills group to reinforce positive coping and to manage mood through music, social interaction, group activities and art task    Attendees: 8/12       Attendance: Did not attend    Additional Notes:  Encouraged but did not attend    Bubbazana Dos Santos, [address]

## 2022-12-26 NOTE — BH NOTES
DAY SHIFT     Pt waiting for breakfast tray in day room. Eats breakfast and attends to ADLs. Pt approaches nursing station multiple time during shift to ask about discharge. Pt does not understand that there will be no discharges today and asks to be discharged so she can go to [facility] to get her belongings. Pt informed multiple times she will not be discharged today. Pt does not have any insight on what she is told and does not have the proper thought process to understand the information she is receiving. Pt denies depression and anxiety, pt denies SI/HI, pt denies hallucinations and delusions.
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
## 2022-12-27 PROCEDURE — 74011250637 HC RX REV CODE- 250/637: Performed by: PSYCHIATRY & NEUROLOGY

## 2022-12-27 PROCEDURE — 65220000001 HC RM PRIVATE PSYCH

## 2022-12-27 RX ADMIN — RISPERIDONE 2 MG: 2 TABLET ORAL at 08:46

## 2022-12-27 RX ADMIN — DIVALPROEX SODIUM 500 MG: 500 TABLET, DELAYED RELEASE ORAL at 08:46

## 2022-12-27 RX ADMIN — DIVALPROEX SODIUM 1000 MG: 500 TABLET, DELAYED RELEASE ORAL at 21:19

## 2022-12-27 RX ADMIN — RISPERIDONE 2 MG: 2 TABLET ORAL at 21:20

## 2022-12-27 NOTE — GROUP NOTE
ELIE[facility]  GROUP DOCUMENTATION INDIVIDUAL                                                                          Group Therapy Note    Date: 12/27/2022    Group Start Time: 1115  Group End Time: 1150  Group Topic: Process Group - Inpatient    SRM 2 BEHA HLTH ACUTE    Faith Carrington    IP [address] [facility] GROUP DOCUMENTATION GROUP    Group Therapy Note    Attendees: 5/12    Process: Pts were asked how they are doing as a check in question. Pts were then encouraged to reflect on their year and consider what they would like to do differently. Pts shared goals, hopes and previous challenges. Pts encouraged to provide feedback to one another and reflect on group       Attendance: Attended    Patient's Goal:  attend activities and groups     Interventions/techniques: Validated, Promoted peer support, Provide feedback, and Supported    Follows Directions: Followed directions    Interactions: Interacted appropriately    Mental Status: Calm, Congruent, and Flat    Behavior/appearance: Attentive, Neatly groomed, and Withdrawn/quiet    Goals Achieved: Able to engage in interactions and Able to listen to others      Additional Notes:  pt was quiet throughout group.  Pt is making some progress towards goals by attending and participating in group    [facility]
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
## 2022-12-27 NOTE — GROUP NOTE
IP  GROUP DOCUMENTATION INDIVIDUAL                                                                          Group Therapy Note    Date: 12/27/2022    Group Start Time: 1030  Group End Time: 0712  Group Topic: Recreational/Music Therapy    SRM 2 BEHA TH ACUTE    Leita Clarity    IP [address] [facility] GROUP DOCUMENTATION GROUP    Group Therapy Note: Facilitator engaged the group in music therapy. Each member selected a song for recreational purposes. Attendees: 6       Attendance: Attended    Patient's Goal:  To attend groups    Interventions/techniques: Informed, Validated, and Supported    Follows Directions:  Followed directions    Interactions: Interacted appropriately    Mental Status: Calm and Preoccupied    Behavior/appearance: Attentive, Cooperative, Disheveled, and Withdrawn/quiet    Goals Achieved: Able to engage in interactions and Able to listen to others      Additional Notes:  pt left group before selecting a song    Metta Hazard

## 2022-12-27 NOTE — BH NOTES
DAY SHIFT    Pt awake eating breakfast and attending to ADLs. Pt asks multiple times about discharge and it was explained to her and stated case management would be coming to see her shortly. Pt is calm, cooperative, and pleasant upon approach and remained that way for the remainder of this shift. Pt denies depression and anxiety, pt denies SI/HI, pt denies hallucinations and delusions. Pt med complaint. Pt did a consult with [address] [place] [address] for reevaluation.

## 2022-12-27 NOTE — PROGRESS NOTES
Problem: Psychosis  Goal: *STG: Remains safe in hospital  Outcome: Progressing Towards Goal     Problem: Psychosis  Goal: *STG/LTG: Complies with medication therapy  Outcome: Progressing Towards Goal     Patient has been safe so far this shift. Patient was medication compliant. Patient remains on close observation. Patient has been alert, oriented to person, place, time. Patient  asked about who would be responsible for her discharge paperwork and why the  hospital is giving  her the \"run around\" and not  discharging  her. Medication compliant. Continue to assess.

## 2022-12-27 NOTE — BH NOTES
Per Maira More at [facility], she is recommending recommitment.  Dr Thomas Lay, [address] [facility] Intake notified, packet made

## 2022-12-27 NOTE — BH NOTES
Behavioral Health Treatment Team Note     Patient goal(s) for today: 'leave'  Treatment team focus/goals: continue medication management, group therapy, maintain ADLS, work on safe discharge plan. Progress note: Pt presents with a flat affect and congruent mood. Pt denies SI/HI/AVH at this time but presents as delusional and disorganized in thought process. Pt is semi tidy, oriented x3. Pt reports that she would like to 'terminate treatment' and feels that she should be allowed to discharge to a hotel on her own accord. Writer explained that the doctor would like to have pt re-evaluated for a TDO due to information for family and concerns regarding a safe discharge plan. Pt said that she 'just needs the travel agency to give me money' and that she just needs access to her purse.  Pt still needs an inpatient level of care due to lack of insight and needing a safe discharge plan    LOS:  12  Expected LOS: TDO until 12/28/22    Insurance info/prescription coverage:  Medicare  Date of last family contact:  Today  Family requesting physician contact today:  no  Discharge plan:  step down with family  Guns in the home:  no   Outpatient provider(s):  Hamida Walls    Participating treatment team members: Sherron Hitchcock, * (assigned SW), ZULEIMA AGUAYO

## 2022-12-27 NOTE — CONSULTS
Consult    Patient: Guera Heard MRN: [id number]  SSN: [id number]    YOB: 1975  Age: 52 y.o. Sex: female      Subjective:      Guera Heard is a 52 y.o. female who is being seen for inpatient evaluation and medical evaluation denies any chest pain or shortness of breath no cough no chills. Past Medical History:   Diagnosis Date    Hyperlipidemia     Low back pain     Related to fall in elevator; Worker's comp physician was Dr. Marium Russell; Saw Dr. Wanda Wilkinson with [facility]    Psychiatric disorder     depression and anxiety; Hospitalized at [facility] Aug 2008    Vertigo 2008     No past surgical history on file. No family history on file.   Social History     Tobacco Use    Smoking status: Not on file    Smokeless tobacco: Not on file   Substance Use Topics    Alcohol use: Not on file      Current Facility-Administered Medications   Medication Dose Route Frequency Provider Last Rate Last Admin    simethicone (MYLICON) tablet 80 mg  80 mg Oral QID PRN Richard Galvan MD        risperiDONE (RisperDAL) tablet 2 mg  2 mg Oral BID Governor Yvette MD   2 mg at 12/27/22 0846    divalproex DR (DEPAKOTE) tablet 500 mg  500 mg Oral DAILY Governor Yvette MD   500 mg at 12/27/22 0846    divalproex DR (DEPAKOTE) tablet 1,000 mg  1,000 mg Oral QHS Governor Yvette MD   1,000 mg at 12/26/22 2148    alum-mag hydroxide-simeth (MYLANTA) oral suspension 30 mL  30 mL Oral QID PRN Governor Yvette MD   30 mL at 12/21/22 2121    hydrOXYzine HCL (ATARAX) tablet 50 mg  50 mg Oral TID PRN Governor Yvette MD        traZODone (DESYREL) tablet 50 mg  50 mg Oral QHS PRN Governor Yvette MD   50 mg at 12/16/22 2056    acetaminophen (TYLENOL) tablet 650 mg  650 mg Oral Q4H PRN Amber Perez MD        magnesium hydroxide (MILK OF MAGNESIA) 400 mg/5 mL oral suspension 30 mL  30 mL Oral DAILY PRN Governor Yvette MD   30 mL at 12/19/22 1940    OLANZapine (ZyPREXA) tablet 10 mg  10 mg Oral Q8H PRN Governadilia Crawford MD   10 mg at 12/24/22 8704 OLANZapine (ZyPREXA) injection 10 mg  10 mg IntraMUSCular Q8H PRN Suaznne Leiva MD   10 mg at 12/16/22 1117    LORazepam (ATIVAN) tablet 1 mg  1 mg Oral Q6H PRN Suzanne Leiva MD   1 mg at 12/24/22 1728    LORazepam (ATIVAN) injection 1 mg  1 mg IntraMUSCular Q6H PRN Suzanne Leiva MD   1 mg at 12/20/22 1020        Allergies   Allergen Reactions    Haldol [Haloperidol Lactate] Other (comments)     She had an unspecified \"reaction\" in 2008       Review of Systems:  A comprehensive review of systems was negative except for that written in the History of Present Illness. Objective:     Vitals:    12/26/22 0749 12/26/22 2347 12/27/22 0800 12/27/22 0926   BP: 129/72 114/76 136/84 136/84   Pulse: 98 100 91 91   Resp: 18 18 18 18   Temp: 98.7 °F (37.1 °C) 99 °F (37.2 °C) 98 °F (36.7 °C) 98 °F (36.7 °C)   SpO2: 98%  96%         Physical Exam:  General:  Alert, cooperative, no distress, appears stated age. Eyes:  Conjunctivae/corneas clear. PERRL, EOMs intact. Fundi benign   Ears:  Normal TMs and external ear canals both ears. Nose: Nares normal. Septum midline. Mucosa normal. No drainage or sinus tenderness. Mouth/Throat: Lips, mucosa, and tongue normal. Teeth and gums normal.   Neck: Supple, symmetrical, trachea midline, no adenopathy, thyroid: no enlargment/tenderness/nodules, no carotid bruit and no JVD. Back:   Symmetric, no curvature. ROM normal. No CVA tenderness. Lungs:   Clear to auscultation bilaterally. Heart:  Regular rate and rhythm, S1, S2 normal, no murmur, click, rub or gallop. Abdomen:   Soft, non-tender. Bowel sounds normal. No masses,  No organomegaly. Extremities: Extremities normal, atraumatic, no cyanosis or edema. Pulses: 2+ and symmetric all extremities. Skin: Skin color, texture, turgor normal. No rashes or lesions   Lymph nodes: Cervical, supraclavicular, and axillary nodes normal.   Neurologic: CNII-XII intact. Normal strength, sensation and reflexes throughout.        No results found for this or any previous visit (from the past 24 hour(s)).     Assessment:     Hospital Problems  Never Reviewed            Codes Class Noted POA    Psychosis ([facility] [address] ICD-10-CM: F29  ICD-9-CM: 298.9  12/15/2022 Unknown        Bipolar disorder with psychotic features [facility]) ICD-10-CM: F31.9  ICD-9-CM: 296.80  12/15/2022 Unknown           Plan:       Current Facility-Administered Medications:     simethicone (MYLICON) tablet 80 mg, 80 mg, Oral, QID PRN, Montserrat JACOBS MD    risperiDONE (RisperDAL) tablet 2 mg, 2 mg, Oral, BID, Marleen Gloria MD, 2 mg at 12/27/22 0846    divalproex DR (DEPAKOTE) tablet 500 mg, 500 mg, Oral, DAILY, Joelle Perez MD, 500 mg at 12/27/22 0846    divalproex DR (DEPAKOTE) tablet 1,000 mg, 1,000 mg, Oral, QHS, Joelle Perez MD, 1,000 mg at 12/26/22 2148    alum-mag hydroxide-simeth (MYLANTA) oral suspension 30 mL, 30 mL, Oral, QID PRN, Marleen Gloria MD, 30 mL at 12/21/22 2121    hydrOXYzine HCL (ATARAX) tablet 50 mg, 50 mg, Oral, TID PRN, Marleen Gloria MD    traZODone (DESYREL) tablet 50 mg, 50 mg, Oral, QHS PRN, Marleen Gloria MD, 50 mg at 12/16/22 2056    acetaminophen (TYLENOL) tablet 650 mg, 650 mg, Oral, Q4H PRN, Joelle Perez MD    magnesium hydroxide (MILK OF MAGNESIA) 400 mg/5 mL oral suspension 30 mL, 30 mL, Oral, DAILY PRN, Joelle Perez MD, 30 mL at 12/19/22 1940    OLANZapine (ZyPREXA) tablet 10 mg, 10 mg, Oral, Q8H PRN, Joelle Perez MD, 10 mg at 12/24/22 1728    OLANZapine (ZyPREXA) injection 10 mg, 10 mg, IntraMUSCular, Q8H PRN, Joelle Perez MD, 10 mg at 12/16/22 1117    LORazepam (ATIVAN) tablet 1 mg, 1 mg, Oral, Q6H PRN, Joelle Perez MD, 1 mg at 12/24/22 1728    LORazepam (ATIVAN) injection 1 mg, 1 mg, IntraMUSCular, Q6H PRN, Marleen Gloria MD, 1 mg at 12/20/22 1020      Signed By: Aneta Brown MD     December 27, 2022

## 2022-12-28 PROCEDURE — 74011250637 HC RX REV CODE- 250/637: Performed by: PSYCHIATRY & NEUROLOGY

## 2022-12-28 PROCEDURE — 65220000001 HC RM PRIVATE PSYCH

## 2022-12-28 RX ADMIN — DIVALPROEX SODIUM 1000 MG: 500 TABLET, DELAYED RELEASE ORAL at 21:48

## 2022-12-28 RX ADMIN — RISPERIDONE 3 MG: 2 TABLET ORAL at 21:44

## 2022-12-28 RX ADMIN — RISPERIDONE 3 MG: 2 TABLET ORAL at 08:20

## 2022-12-28 RX ADMIN — DIVALPROEX SODIUM 500 MG: 500 TABLET, DELAYED RELEASE ORAL at 08:20
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
## 2022-12-28 NOTE — GROUP NOTE
IP  GROUP DOCUMENTATION INDIVIDUAL                                                                          Group Therapy Note    Date: 12/28/2022    Group Start Time: 1115  Group End Time: 1200  Group Topic: Education Group - Inpatient    SRM 2  NON ACUTE    Latoya Alicia    [facility] GROUP DOCUMENTATION GROUP    Group Therapy Note    Facilitated discussion focused on developing a plan for safety to identify warning signs, coping skills , people and community resources to help manage stress and maintain safety    Attendees: 7/9       Attendance: Attended    Patient's Goal:  Attend group daily     Interventions/techniques: Informed and Supported    Follows Directions: Followed directions    Interactions: Interacted appropriately    Mental Status: Calm    Behavior/appearance: Cooperative    Goals Achieved: Able to engage in interactions, Able to listen to others, and Able to self-disclose      Additional Notes:  Receptive to information discussed. Pt was pulled out of group to attend her TDO hearing.  Did not return    Jatin Claire, [address]

## 2022-12-28 NOTE — PROGRESS NOTES
Problem: Falls - Risk of  Goal: *Absence of Falls  Description: Document Green Salvia Fall Risk and appropriate interventions in the flowsheet. Outcome: Progressing Towards Goal  Note: Fall Risk Interventions:     Medication Interventions: Teach patient to arise slowly    Problem: General Medical Care Plan  Goal: *Skin integrity maintained  Outcome: Progressing Towards Goal     Problem: Psychosis  Goal: *STG: Remains safe in hospital  Outcome: Progressing Towards Goal     Problem: *Psychosis: Discharge Outcome  Goal: *Absent or Controlled Active Psychotic Symptoms  Outcome: Progressing Towards Goal    -pt ambulates independently.  -no c/o skin discomfort.  -pt remains safe; no self injurious behavior noted or reported. -no active psychotic symptoms noted or reported.
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
## 2022-12-28 NOTE — GROUP NOTE
ELIEZER  GROUP DOCUMENTATION INDIVIDUAL                                                                          Group Therapy Note    Date: 12/28/2022    Group Start Time: 2318  Group End Time: 8502  Group Topic: Process Group - Inpatient    SRM 2 BEHA HLTH ACUTE    Faith Carrington    IP [address] [facility] GROUP DOCUMENTATION GROUP    Group Therapy Note    Attendees: 2/8    Process: pts asked how they are doing as a check in question. Pts were then encouraged to share their feelings and thoughts regarding hospitalization. Pts were provided with a paper that went over 'human bill of rights' and provided affirmations regarding allowing themselves to heal and create that safe space. Pts encouraged to provided feedback to one another and reflect on group       Attendance: Attended    Patient's Goal:  attend activities and groups     Interventions/techniques: Validated, Promoted peer support, Provide feedback, and Supported    Follows Directions: Followed directions    Interactions: Interacted appropriately    Mental Status: Calm, Congruent, and Flat    Behavior/appearance: Attentive, Motivated, Neatly groomed, and Withdrawn/quiet    Goals Achieved: Able to engage in interactions, Able to listen to others, Able to reflect/comment on own behavior, and Able to manage/cope with feelings      Additional Notes:  pt spoke about how it is nice to remind ourselves of positives when feeling overwhelmed.  Pt is making progress towards goals by attending and participating in group    [facility] Post-Care Instructions: I reviewed with the patient in detail post-care instructions. Patient is to wear sunprotection, and avoid picking at any of the treated lesions. Pt may apply Vaseline to crusted or scabbing areas. Medical Necessity Information: It is in your best interest to select a reason for this procedure from the list below. All of these items fulfill various CMS LCD requirements except the new and changing color options. Consent: The patient's consent was obtained including but not limited to risks of crusting, scabbing, blistering, scarring, darker or lighter pigmentary change, recurrence, incomplete removal and infection. Number Of Freeze-Thaw Cycles: 2 freeze-thaw cycles Detail Level: Detailed Include Z78.9 (Other Specified Conditions Influencing Health Status) As An Associated Diagnosis?: No Render Post-Care Instructions In Note?: yes Medical Necessity Clause: This procedure was medically necessary because the lesions that were treated were:

## 2022-12-28 NOTE — GROUP NOTE
IP  GROUP DOCUMENTATION INDIVIDUAL                                                                          Group Therapy Note    Date: 12/28/2022    Group Start Time: 5745  Group End Time: 0601  Group Topic: Recreational/Music Therapy    SRM 2  NON ACUTE    Surinder Escamilla    IP [address] [facility] GROUP DOCUMENTATION GROUP    Group Therapy Note    Facilitated leisure skills group to reinforce positive coping and to manage mood through music, social interaction, group activities and art task    Attendees: 7/9       Attendance: Attended    Patient's Goal:  Attend group daily     Interventions/techniques: Art integration and Supported    Follows Directions: Followed directions    Interactions: Interacted appropriately    Mental Status: Calm    Behavior/appearance: Cooperative    Goals Achieved: Able to engage in interactions and Able to listen to others      Additional Notes:  Receptive to listening to music and a song selected. Declined to work on leisure task.  Interacted with peer and staff when prompted    CORNELL Dao
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
## 2022-12-28 NOTE — BH NOTES
Patient affect flat with fair eye contact, cooperative, isolative in social situations. Patient verbalized that she wanted to go home, appreciative of condolences made by writer to patient. She rated depression at a 5 on a scale of 0-10. Patient denied SI, HI, AH, and VH. Patient remains on close observation, Q 15 minute checks.

## 2022-12-29 PROCEDURE — 74011250637 HC RX REV CODE- 250/637: Performed by: PSYCHIATRY & NEUROLOGY

## 2022-12-29 PROCEDURE — 65220000001 HC RM PRIVATE PSYCH

## 2022-12-29 RX ADMIN — RISPERIDONE 3 MG: 2 TABLET ORAL at 09:02

## 2022-12-29 RX ADMIN — DIVALPROEX SODIUM 1000 MG: 500 TABLET, DELAYED RELEASE ORAL at 20:34

## 2022-12-29 RX ADMIN — DIVALPROEX SODIUM 500 MG: 500 TABLET, DELAYED RELEASE ORAL at 09:02

## 2022-12-29 RX ADMIN — RISPERIDONE 3 MG: 2 TABLET ORAL at 20:34
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
## 2022-12-29 NOTE — PROGRESS NOTES
Consult    Patient: Bienvenido Latham MRN: [id number]  SSN: [id number]    YOB: 1975  Age: 52 y.o. Sex: female      Subjective:      Bienvenido Latham is a 52 y.o. female who is being seen for inpatient evaluation and medical evaluation denies any chest pain or shortness of breath no cough no chills. Past Medical History:   Diagnosis Date    Hyperlipidemia     Low back pain     Related to fall in elevator; Worker's comp physician was Dr. Hernández; Saw Dr. Delaney Cummings with [facility]    Psychiatric disorder     depression and anxiety; Hospitalized at [facility] Aug 2008    Vertigo 2008     No past surgical history on file. No family history on file.   Social History     Tobacco Use    Smoking status: Not on file    Smokeless tobacco: Not on file   Substance Use Topics    Alcohol use: Not on file      Current Facility-Administered Medications   Medication Dose Route Frequency Provider Last Rate Last Admin    risperiDONE (RisperDAL) tablet 3 mg  3 mg Oral BID Ganesh Watts MD   3 mg at 12/29/22 0902    simethicone (MYLICON) tablet 80 mg  80 mg Oral QID PRN Ted Toledo MD        divalproex  (DEPAKOTE) tablet 500 mg  500 mg Oral DAILY Feng Perez MD   500 mg at 12/29/22 0902    divalproex  (DEPAKOTE) tablet 1,000 mg  1,000 mg Oral QHS Vineet Johnson MD   1,000 mg at 12/28/22 2148    alum-mag hydroxide-simeth (MYLANTA) oral suspension 30 mL  30 mL Oral QID PRN Vineet Johnson MD   30 mL at 12/21/22 2121    hydrOXYzine HCL (ATARAX) tablet 50 mg  50 mg Oral TID PRN Vineet Johnson MD        traZODone (DESYREL) tablet 50 mg  50 mg Oral QHS PRN Vineet Johnson MD   50 mg at 12/16/22 2056    acetaminophen (TYLENOL) tablet 650 mg  650 mg Oral Q4H PRN Feng Perez MD        magnesium hydroxide (MILK OF MAGNESIA) 400 mg/5 mL oral suspension 30 mL  30 mL Oral DAILY PRN Vineet Johnson MD   30 mL at 12/19/22 1940    OLANZapine (ZyPREXA) tablet 10 mg  10 mg Oral Q8H PRN Vineet Johnson MD   10 mg at 12/24/22 1728    OLANZapine (ZyPREXA) injection 10 mg  10 mg IntraMUSCular Q8H PRN Carine Vera MD   10 mg at 12/16/22 1117    LORazepam (ATIVAN) tablet 1 mg  1 mg Oral Q6H PRN Carine Vera MD   1 mg at 12/24/22 1728    LORazepam (ATIVAN) injection 1 mg  1 mg IntraMUSCular Q6H PRN Carine Vera MD   1 mg at 12/20/22 1020        Allergies   Allergen Reactions    Haldol [Haloperidol Lactate] Other (comments)     She had an unspecified \"reaction\" in 2008       Review of Systems:  A comprehensive review of systems was negative except for that written in the History of Present Illness. Objective:     Vitals:    12/27/22 2002 12/28/22 0739 12/28/22 2135 12/29/22 0818   BP: (!) 89/56 134/83 116/74 124/73   Pulse: (!) 102 82 91 92   Resp: 18 19 18 18   Temp: 98.7 °F (37.1 °C) 98.4 °F (36.9 °C) 98.1 °F (36.7 °C) 97.6 °F (36.4 °C)   SpO2:            Physical Exam:  General:  Alert, cooperative, no distress, appears stated age. Eyes:  Conjunctivae/corneas clear. PERRL, EOMs intact. Fundi benign   Ears:  Normal TMs and external ear canals both ears. Nose: Nares normal. Septum midline. Mucosa normal. No drainage or sinus tenderness. Mouth/Throat: Lips, mucosa, and tongue normal. Teeth and gums normal.   Neck: Supple, symmetrical, trachea midline, no adenopathy, thyroid: no enlargment/tenderness/nodules, no carotid bruit and no JVD. Back:   Symmetric, no curvature. ROM normal. No CVA tenderness. Lungs:   Clear to auscultation bilaterally. Heart:  Regular rate and rhythm, S1, S2 normal, no murmur, click, rub or gallop. Abdomen:   Soft, non-tender. Bowel sounds normal. No masses,  No organomegaly. Extremities: Extremities normal, atraumatic, no cyanosis or edema. Pulses: 2+ and symmetric all extremities. Skin: Skin color, texture, turgor normal. No rashes or lesions   Lymph nodes: Cervical, supraclavicular, and axillary nodes normal.   Neurologic: CNII-XII intact.  Normal strength, sensation and reflexes throughout. No results found for this or any previous visit (from the past 24 hour(s)).     Assessment:     Hospital Problems  Never Reviewed            Codes Class Noted POA    Psychosis ([facility] [address] ICD-10-CM: F29  ICD-9-CM: 298.9  12/15/2022 Unknown        Bipolar disorder with psychotic features [facility]) ICD-10-CM: F31.9  ICD-9-CM: 296.80  12/15/2022 Unknown           Plan:       Current Facility-Administered Medications:     risperiDONE (RisperDAL) tablet 3 mg, 3 mg, Oral, BID, Stephanie JACOBS MD, 3 mg at 12/29/22 0902    simethicone (MYLICON) tablet 80 mg, 80 mg, Oral, QID PRN, Stephanie JACOBS MD    divalproex DR (DEPAKOTE) tablet 500 mg, 500 mg, Oral, DAILY, Joelle Perez MD, 500 mg at 12/29/22 0902    divalproex DR (DEPAKOTE) tablet 1,000 mg, 1,000 mg, Oral, QHS, Joelle Perez MD, 1,000 mg at 12/28/22 2148    alum-mag hydroxide-simeth (MYLANTA) oral suspension 30 mL, 30 mL, Oral, QID PRN, Don Valladares MD, 30 mL at 12/21/22 2121    hydrOXYzine HCL (ATARAX) tablet 50 mg, 50 mg, Oral, TID PRN, Don Valladares MD    traZODone (DESYREL) tablet 50 mg, 50 mg, Oral, QHS PRN, Don Valladares MD, 50 mg at 12/16/22 2056    acetaminophen (TYLENOL) tablet 650 mg, 650 mg, Oral, Q4H PRN, Joelle Perez MD    magnesium hydroxide (MILK OF MAGNESIA) 400 mg/5 mL oral suspension 30 mL, 30 mL, Oral, DAILY PRN, Joelle Perez MD, 30 mL at 12/19/22 1940    OLANZapine (ZyPREXA) tablet 10 mg, 10 mg, Oral, Q8H PRN, Joelle Perez MD, 10 mg at 12/24/22 1728    OLANZapine (ZyPREXA) injection 10 mg, 10 mg, IntraMUSCular, Q8H PRN, Joelle Perez MD, 10 mg at 12/16/22 1117    LORazepam (ATIVAN) tablet 1 mg, 1 mg, Oral, Q6H PRN, Joelle Perez MD, 1 mg at 12/24/22 1728    LORazepam (ATIVAN) injection 1 mg, 1 mg, IntraMUSCular, Q6H PRN, Don Valladares MD, 1 mg at 12/20/22 1020      Signed By: Beatris Dee MD     December 29, 2022
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
## 2022-12-29 NOTE — GROUP NOTE
[facility] GROUP DOCUMENTATION INDIVIDUAL                                                                          Group Therapy Note    Date: 12/29/2022    Group Start Time: 1115  Group End Time: 4936  Group Topic: Recreational/Music Therapy    [facility] 2  NON ACUTE    [facility]    IP [address] [facility] GROUP DOCUMENTATION GROUP    Group Therapy Note    Facilitated leisure skills group to reinforce positive coping and to manage mood through music, social interaction, group activities and art task    Attendees: 5/11       Attendance: Did not attend    Additional Notes:  Encouraged but did not attend    Dedra Cordero, [address]

## 2022-12-29 NOTE — BH NOTES
Patient pleasant upon approach, medication compliant, and attending groups. Patient isolative in social situations, affect constricted to flat with good eye contact. Patient requesting to go back to . Patient directed to speak to the Doctor and to her case management. Patient remains on close observation, Q 15 minute checks.
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
## 2022-12-29 NOTE — GROUP NOTE
ELIEZER  GROUP DOCUMENTATION INDIVIDUAL                                                                          Group Therapy Note    Date: 12/29/2022    Group Start Time: 0933  Group End Time: 2468  Group Topic: Education Group - Inpatient    SRM 2  NON ACUTE    Camille Gaming    [facility] GROUP DOCUMENTATION GROUP    Group Therapy Note    Facilitated group to focus on exploring values and utilized values discussion questions    Attendees: 7/13       Attendance: Attended    Patient's Goal:  Attend group daily     Interventions/techniques: Informed and Supported    Follows Directions: Followed directions    Interactions: Interacted appropriately    Mental Status: Calm    Behavior/appearance: Cooperative and Needed prompting    Goals Achieved: Able to engage in interactions, Able to listen to others, and Able to self-disclose      Additional Notes:  Receptive to information discussed on values and engaged in discussion with prompting.  Pt shared\" what a friend or family member may describe as one of her values     Nasra Brown
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
## 2022-12-29 NOTE — GROUP NOTE
[facility] GROUP DOCUMENTATION INDIVIDUAL                                                                          Group Therapy Note    Date: 12/29/2022    Group Start Time: 8374  Group End Time: 1100  Group Topic: Process Group - Inpatient    SRM CARE MANAGEMENT    Apoorva Rodriguez BSW    [facility] GROUP DOCUMENTATION GROUP    Group Therapy Note    The writer processed with the group and discussed how to decastrophize thoughts. Attendees: 5/8       Attendance: Attended    Patient's Goal:  Attend Group     Interventions/techniques: Challenged    Follows Directions: Unable to follow directions    Interactions: Disorganized interaction    Mental Status: Delusions, Preoccupied, and Suspicious    Behavior/appearance: Disheveled and Needed prompting    Goals Achieved: Able to listen to others      Additional Notes: The patient didn't complete her handout but shared her concerns of not being discharged and needing to see her .      ZAK Coates

## 2022-12-30 PROCEDURE — 74011250637 HC RX REV CODE- 250/637: Performed by: PSYCHIATRY & NEUROLOGY

## 2022-12-30 PROCEDURE — 65220000001 HC RM PRIVATE PSYCH

## 2022-12-30 RX ADMIN — DIVALPROEX SODIUM 500 MG: 500 TABLET, DELAYED RELEASE ORAL at 09:17

## 2022-12-30 RX ADMIN — ACETAMINOPHEN 650 MG: 325 TABLET, FILM COATED ORAL at 21:19

## 2022-12-30 RX ADMIN — RISPERIDONE 3 MG: 2 TABLET ORAL at 21:19

## 2022-12-30 RX ADMIN — DIVALPROEX SODIUM 1000 MG: 500 TABLET, DELAYED RELEASE ORAL at 21:19

## 2022-12-30 RX ADMIN — RISPERIDONE 3 MG: 2 TABLET ORAL at 09:17

## 2022-12-30 NOTE — BH NOTES
Behavioral Health Transition Record to Provider    Patient Name: Paloma Baires  YOB: 1975  Medical Record Number: [id number]  Date of Admission: 12/15/2022  Date of Discharge: 12.31.2022    Attending Provider: Andrea Pelaez MD  Discharging Provider: Dr Hershal Bosworth  To contact this individual call [phone] and ask the  to page. If unavailable, ask to be transferred to [facility] Provider on call. [facility] Provider will be available on call 24/7 and during holidays. Primary Care Provider: Unknown, Provider, MD    Allergies   Allergen Reactions    Haldol [Haloperidol Lactate] Other (comments)     She had an unspecified \"reaction\" in 2008       Reason for Admission: Pt was admitted due to bizarre and religiously preoccupied bx. Pt has hx of non medcompliance and multiple hospitalizations     Admission Diagnosis: Psychosis ([facility] [F29]  Bipolar disorder with psychotic features ([facility] [F31.9]    * No surgery found *    Results for orders placed or performed during the hospital encounter of 12/15/22   VALPROIC ACID   Result Value Ref Range    Valproic acid 69 50 - 100 ug/mL       Immunizations administered during this encounter: There is no immunization history on file for this patient. Screening for Metabolic Disorders for Patients on Antipsychotic Medications  (Data obtained from the EMR)    Estimated Body Mass Index  Estimated body mass index is 45.6 kg/m² as calculated from the following:    Height as of an earlier encounter on 12/15/22: 5' 5\" (1.651 m). Weight as of an earlier encounter on 12/15/22: 124.3 kg (274 lb).      Vital Signs/Blood Pressure  Visit Vitals  BP 93/60   Pulse 95   Temp 98 °F (36.7 °C)   Resp 18   SpO2 96%       Blood Glucose/Hemoglobin A1c  Lab Results   Component Value Date/Time    Glucose 210 (H) 12/15/2022 07:01 AM    Glucose 89 10/25/2012 04:50 AM    Glucose (POC) 113 (H) 10/24/2012 05:59 PM       No results found for: HBA1C, [id number] Lipid Panel  Lab Results   Component Value Date/Time    Cholesterol, total 175 10/25/2012 04:50 AM    HDL Cholesterol 47 10/25/2012 04:50 AM    LDL, calculated 115 (H) 10/25/2012 04:50 AM    Triglyceride 65 10/25/2012 04:50 AM    CHOL/HDL Ratio 3.7 10/25/2012 04:50 AM        Discharge Diagnosis: psychosis     Discharge Plan: Pt will be discharging to a hotel of her choosing. A referral has been sent to [facility] for continuum of care. Pt has been provided with a 30 day supply of her medications and clothes. Pt has been provided phone number of [employer] Representative who is working on pt case. Discharge Medication List and Instructions:   Current Discharge Medication List        START taking these medications    Details   !! divalproex DR (DEPAKOTE) 500 mg tablet Take 1 Tablet by mouth daily. Qty: 30 Tablet, Refills: 1  Start date: 12/24/2022      !! divalproex DR (DEPAKOTE) 500 mg tablet Take 2 Tablets by mouth nightly. Qty: 60 Tablet, Refills: 1  Start date: 12/23/2022      risperiDONE (RisperDAL) 2 mg tablet Take 1 Tablet by mouth two (2) times a day. Qty: 60 Tablet, Refills: 1  Start date: 12/23/2022      traZODone (DESYREL) 50 mg tablet Take 1 Tablet by mouth nightly as needed for Sleep (For insomnia). Qty: 30 Tablet, Refills: 1  Start date: 12/23/2022       !! - Potential duplicate medications found. Please discuss with provider.         STOP taking these medications       divalproex ER (DEPAKOTE ER) 500 mg ER tablet Comments:   Reason for Stopping:               Unresulted Labs (24h ago, onward)       Start     Ordered    12/31/22 0700  VALPROIC ACID  TOMORROW AM,   R         12/30/22 1325                  To obtain results of studies pending at discharge, please contact [phone]    Follow-up Information       Follow up With Specialties Details Why Contact Info    Unknown, Provider, MD Allergy   Patient not available to ask      [facility]  Follow up They will be contacting you on Monday. A referral has been made so they can assist you with case management and medication management [phone] [address] [facility], [address] [place] [address]  Call on 1/3/2023 Please call Detective Peterson so that you can be in touch with him for any support as needed [phone] [facility]  Follow up Please contact them from your hotel room as they will be able to assist with placement and potentially financial assistance if needed [phone] [address]            Advanced Directive:   Does the patient have an appointed surrogate decision maker? No  Does the patient have a Medical Advance Directive? No  Does the patient have a Psychiatric Advance Directive? No  If the patient does not have a surrogate or Medical Advance Directive AND Psychiatric Advance Directive, the patient was offered information on these advance directives Patient will complete at a later time    Patient Instructions: Please continue all medications until otherwise directed by physician. Tobacco Cessation Discharge Plan:   Is the patient a smoker and needs referral for smoking cessation? Not applicable  Patient referred to the following for smoking cessation with an appointment? Not applicable     Patient was offered medication to assist with smoking cessation at discharge? Not applicable  Was education for smoking cessation added to the discharge instructions? Not applicable    Alcohol/Substance Abuse Discharge Plan:   Does the patient have a history of substance/alcohol abuse and requires a referral for treatment? Not applicable  Patient referred to the following for substance/alcohol abuse treatment with an appointment? Not applicable  Patient was offered medication to assist with alcohol cessation at discharge? Not applicable  Was education for substance/alcohol abuse added to discharge instructions?  Not applicable    Patient discharged to Home; provided to the patient/caregiver either in hard copy or electronically.
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
## 2022-12-30 NOTE — PROGRESS NOTES
Problem: Falls - Risk of  Goal: *Absence of Falls  Description: Document Lillian Bertrand Fall Risk and appropriate interventions in the flowsheet.   Outcome: Progressing Towards Goal  Note: Fall Risk Interventions:            Medication Interventions: Teach patient to arise slowly                   Problem: Psychosis  Goal: *STG: Remains safe in hospital  Outcome: Progressing Towards Goal     Problem: Psychosis  Goal: *STG: Accept constructive criticism without injury or isolation  Outcome: Progressing Towards Goal

## 2022-12-30 NOTE — GROUP NOTE
ELIEZER  GROUP DOCUMENTATION INDIVIDUAL                                                                          Group Therapy Note    Date: 12/30/2022    Group Start Time: 2070  Group End Time: 1000  Group Topic: Recreational/Music Therapy    SRM 2 BEHA HLTH ACUTE    Til[place] Shock    [facility] GROUP DOCUMENTATION GROUP    Group Therapy Note Mort Breana    Attendees: 4       Attendance: Did not attend    Patient's Goal:      Interventions/techniques: Follows Directions:     Interactions:     Mental Status:     Behavior/appearance:     Goals Achieved: Additional Notes:  Patient did not attend group.     Mimi Osuna

## 2022-12-30 NOTE — BH NOTES
Faxed prescriptions to [facility]. Confirmed receipt from [facility]. Prescriptions to be delivered today. Personal clothing stored in storage for patient to be discharged tomorrow. Purse was released by [facility] and brought to hospital; stored in unit safe to be returned to patient on discharge tomorrow 12.31.22.

## 2022-12-30 NOTE — PROGRESS NOTES
Progress Note  Date:2022       Room:[facility]  Patient Name:Liss Stevens     YOB: 1975     Age:47 y.o. Subjective    Subjective:  Symptoms:  Stable. No anxiety. Diet:  Adequate intake. Activity level: Normal.     Review of Systems   Psychiatric/Behavioral:  Negative for agitation, behavioral problems, dysphoric mood, hallucinations, self-injury, sleep disturbance and suicidal ideas. Objective         Vitals Last 24 Hours:  TEMPERATURE:  Temp  Av.4 °F (36.9 °C)  Min: 98 °F (36.7 °C)  Max: 98.7 °F (37.1 °C)  RESPIRATIONS RANGE: Resp  Av  Min: 18  Max: 18  PULSE OXIMETRY RANGE: No data recorded  PULSE RANGE: Pulse  Av.5  Min: 95  Max: 96  BLOOD PRESSURE RANGE: Systolic (51OZI), MTU:34 , Min:93 , AGK:51   ; Diastolic (42QGX), NPK:17, Min:60, Max:79    I/O (24Hr): No intake or output data in the 24 hours ending 22  Objective:  General Appearance: In no acute distress. Vital signs: (most recent): Blood pressure 93/60, pulse 95, temperature 98 °F (36.7 °C), resp. rate 18, SpO2 96 %. Vital signs are normal.    Neurological: Patient is alert and oriented to person, place and time. Labs/Imaging/Diagnostics    Labs:  CBC:No results for input(s): WBC, RBC, HGB, HCT, MCV, RDW, PLT, HGBEXT, HCTEXT, PLTEXT in the last 72 hours. CHEMISTRIES:No results for input(s): NA, K, CL, CO2, BUN, CA, PHOS, MG in the last 72 hours. No lab exists for component: CREATININE, GLUCOSEPT/INR:No results for input(s): INR, INREXT in the last 72 hours. No lab exists for component: PROTIME  APTT:No results for input(s): APTT in the last 72 hours. LIVER PROFILE:No results for input(s): AST, ALT in the last 72 hours. No lab exists for component: Vincent Ila, ALKPHOS  Lab Results   Component Value Date/Time    ALT (SGPT) 41 12/15/2022 07:01 AM    AST (SGOT) 11 (L) 12/15/2022 07:01 AM    Alk.  phosphatase 94 12/15/2022 07:01 AM Bilirubin, direct 0.1 10/31/2012 08:25 AM    Bilirubin, total 0.2 12/15/2022 07:01 AM       Imaging Last 24 Hours:  No results found. Assessment//Plan   Active Problems:    Psychosis ([facility] (12/15/2022)      Bipolar disorder with psychotic features ([facility] (12/15/2022)      Assessment:    Condition: In stable condition. Improving. Plan:   Discharge home.      12/31/2022  Depakote level scheduled tomorrow      Current Facility-Administered Medications:     risperiDONE (RisperDAL) tablet 3 mg, 3 mg, Oral, BID, Ganesh Watts MD, 3 mg at 12/30/22 6254    simethicone (MYLICON) tablet 80 mg, 80 mg, Oral, QID PRN, Tayla JACOBS MD    divalproex  (DEPAKOTE) tablet 500 mg, 500 mg, Oral, DAILY, Joelle Perez MD, 500 mg at 12/30/22 6147    divalproex DR (DEPAKOTE) tablet 1,000 mg, 1,000 mg, Oral, QHS, Joelle Perez MD, 1,000 mg at 12/29/22 2034    alum-mag hydroxide-simeth (MYLANTA) oral suspension 30 mL, 30 mL, Oral, QID PRN, London Mercado MD, 30 mL at 12/21/22 2121    hydrOXYzine HCL (ATARAX) tablet 50 mg, 50 mg, Oral, TID PRN, London Mercado MD    traZODone (DESYREL) tablet 50 mg, 50 mg, Oral, QHS PRN, London Mercado MD, 50 mg at 12/16/22 2056    acetaminophen (TYLENOL) tablet 650 mg, 650 mg, Oral, Q4H PRN, Joelle Perez MD    magnesium hydroxide (MILK OF MAGNESIA) 400 mg/5 mL oral suspension 30 mL, 30 mL, Oral, DAILY PRN, Joelle Perez MD, 30 mL at 12/19/22 1940    OLANZapine (ZyPREXA) tablet 10 mg, 10 mg, Oral, Q8H PRN, Joelle Perez MD, 10 mg at 12/24/22 1728    OLANZapine (ZyPREXA) injection 10 mg, 10 mg, IntraMUSCular, Q8H PRN, London Mercado MD, 10 mg at 12/16/22 1117    LORazepam (ATIVAN) tablet 1 mg, 1 mg, Oral, Q6H PRN, Joelle Perez MD, 1 mg at 12/24/22 1728    LORazepam (ATIVAN) injection 1 mg, 1 mg, IntraMUSCular, Q6H PRN, London Mercado MD, 1 mg at 12/20/22 1020     Electronically signed by Kari Roberson MD on 12/30/2022 at 6:52 PM
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
## 2022-12-30 NOTE — GROUP NOTE
IP  GROUP DOCUMENTATION INDIVIDUAL                                                                          Group Therapy Note    Date: 12/30/2022    Group Start Time: 9362  Group End Time: 1400  Group Topic: Process Group - Inpatient    SRM 2 BEHA HLTH ACUTE    Vicenta Guzmán    IP [address] [facility] GROUP DOCUMENTATION GROUP    Group Therapy Note: Facilitator engaged the group in discussion about the emotion Anger. Provided handouts. Attendees: 7       Attendance: Attended    Patient's Goal:  To attend groups    Interventions/techniques: Informed, Validated, and Supported    Follows Directions: Followed directions    Interactions: Interacted appropriately    Mental Status: Calm and Congruent    Behavior/appearance: Attentive, Cooperative, and Withdrawn/quiet    Goals Achieved: Able to engage in interactions and Able to listen to others      Additional Notes:  Pt smiled an nodded appropriately to other peers' feedback on the topic.     Vinay Matias

## 2022-12-30 NOTE — GROUP NOTE
IP  GROUP DOCUMENTATION INDIVIDUAL                                                                          Group Therapy Note    Date: 12/30/2022    Group Start Time: 1505  Group End Time: 1545  Group Topic: Education Group - Inpatient    SRM 2  NON ACUTE    Angela Reardon    IP [address] [facility] GROUP DOCUMENTATION GROUP    Group Therapy Note    Facilitated discussion on  stress exploration focused on  being able to identify daily hassles, major life changes and life circumstances that contribute to stress and also identify daily uplifts, healthy coping strategies and protective factors that counteract stress    Attendees: 4/11       Attendance: Attended    Patient's Goal:  Attend group daily     Interventions/techniques: Informed and Supported    Follows Directions: Followed directions    Interactions: Interacted appropriately    Mental Status: Calm    Behavior/appearance: Cooperative    Goals Achieved: Able to engage in interactions, Able to listen to others, Able to self-disclose, and Discussed coping      Additional Notes:  Receptive to information discussed and engaged.  Pt shared \"job change' was a factor that contribute to stress and \"being able to work when she is capable, take a walk and eat her favorite food/chicken wings\" are factors that will help manage stress    Titus Hyde, [address]

## 2022-12-30 NOTE — BH NOTES
DISCHARGE SUMMARY    NAME:Liss Pan  : 1975  MRN: [id number]    The patient [address] exhibits the ability to control behavior in a less restrictive environment. Patient's level of functioning is improving. No assaultive/destructive behavior has been observed for the past 24 hours. No suicidal/homicidal threat or behavior has been observed for the past 24 hours. There is no evidence of serious medication side effects. Patient has not been in physical or protective restraints for at least the past 24 hours. If weapons involved, how are they secured? N/a    Is patient aware of and in agreement with discharge plan? yes    Arrangements for medication:  Prescriptions will be delivered to pt prior to discharge     Copy of discharge instructions to provider?:  yes    Arrangements for transportation home:  pt will take taxi to hotel of her choosing     Keep all follow up appointments as scheduled, continue to take prescribed medications per physician instructions.   Mental health crisis number:  773 or your local mental health crisis line number at [place] [address] Emergency WARM LINE      4-912-938-MHAV ([phone])      M-F: 9am to 9pm      Sat & Sun: 5pm - 9pm  National suicide prevention lines:                             1-354-PMHVWJG ([phone])       5-307-527-TALK ([phone])    Crisis Text Line:  Text HOME to [id number]

## 2022-12-30 NOTE — PROGRESS NOTES
Consult    Patient: Jeovanny Shields MRN: [id number]  SSN: [id number]    YOB: 1975  Age: 52 y.o. Sex: female      Subjective:      Jeovanny Shields is a 52 y.o. female who is being seen for inpatient evaluation and medical evaluation denies any chest pain or shortness of breath no cough no chills. Past Medical History:   Diagnosis Date    Hyperlipidemia     Low back pain     Related to fall in elevator; Worker's comp physician was Dr. Surjit Francisco; Saw Dr. Jessica Cote with [facility]    Psychiatric disorder     depression and anxiety; Hospitalized at [facility] Aug 2008    Vertigo 2008     No past surgical history on file. No family history on file.   Social History     Tobacco Use    Smoking status: Not on file    Smokeless tobacco: Not on file   Substance Use Topics    Alcohol use: Not on file      Current Facility-Administered Medications   Medication Dose Route Frequency Provider Last Rate Last Admin    risperiDONE (RisperDAL) tablet 3 mg  3 mg Oral BID Luis Carlos JACOBS MD   3 mg at 12/30/22 8844    simethicone (MYLICON) tablet 80 mg  80 mg Oral QID PRN Lazarus Del Rosario MD        divalproex  (DEPAKOTE) tablet 500 mg  500 mg Oral DAILY Joelle Perez MD   500 mg at 12/30/22 3360    divalproex DR (DEPAKOTE) tablet 1,000 mg  1,000 mg Oral QHS Tripp Garcia MD   1,000 mg at 12/29/22 2034    alum-mag hydroxide-simeth (MYLANTA) oral suspension 30 mL  30 mL Oral QID PRN Tripp Garcia MD   30 mL at 12/21/22 2121    hydrOXYzine HCL (ATARAX) tablet 50 mg  50 mg Oral TID PRN Tripp Garcia MD        traZODone (DESYREL) tablet 50 mg  50 mg Oral QHS PRN Tripp Garcia MD   50 mg at 12/16/22 2056    acetaminophen (TYLENOL) tablet 650 mg  650 mg Oral Q4H PRN Kristin Perez MD        magnesium hydroxide (MILK OF MAGNESIA) 400 mg/5 mL oral suspension 30 mL  30 mL Oral DAILY PRN Tripp Garcia MD   30 mL at 12/19/22 1940    OLANZapine (ZyPREXA) tablet 10 mg  10 mg Oral Q8H PRN Tripp Garcia MD   10 mg at 12/24/22 1728    OLANZapine (ZyPREXA) injection 10 mg  10 mg IntraMUSCular Q8H PRN Hector Qureshi MD   10 mg at 12/16/22 1117    LORazepam (ATIVAN) tablet 1 mg  1 mg Oral Q6H PRN Hector Qureshi MD   1 mg at 12/24/22 1728    LORazepam (ATIVAN) injection 1 mg  1 mg IntraMUSCular Q6H PRN Hector Qureshi MD   1 mg at 12/20/22 1020        Allergies   Allergen Reactions    Haldol [Haloperidol Lactate] Other (comments)     She had an unspecified \"reaction\" in 2008       Review of Systems:  A comprehensive review of systems was negative except for that written in the History of Present Illness. Objective:     Vitals:    12/28/22 0739 12/28/22 2135 12/29/22 0818 12/29/22 2135   BP: 134/83 116/74 124/73 98/79   Pulse: 82 91 92 96   Resp: 19 18 18 18   Temp: 98.4 °F (36.9 °C) 98.1 °F (36.7 °C) 97.6 °F (36.4 °C) 98.7 °F (37.1 °C)   SpO2:            Physical Exam:  General:  Alert, cooperative, no distress, appears stated age. Eyes:  Conjunctivae/corneas clear. PERRL, EOMs intact. Fundi benign   Ears:  Normal TMs and external ear canals both ears. Nose: Nares normal. Septum midline. Mucosa normal. No drainage or sinus tenderness. Mouth/Throat: Lips, mucosa, and tongue normal. Teeth and gums normal.   Neck: Supple, symmetrical, trachea midline, no adenopathy, thyroid: no enlargment/tenderness/nodules, no carotid bruit and no JVD. Back:   Symmetric, no curvature. ROM normal. No CVA tenderness. Lungs:   Clear to auscultation bilaterally. Heart:  Regular rate and rhythm, S1, S2 normal, no murmur, click, rub or gallop. Abdomen:   Soft, non-tender. Bowel sounds normal. No masses,  No organomegaly. Extremities: Extremities normal, atraumatic, no cyanosis or edema. Pulses: 2+ and symmetric all extremities. Skin: Skin color, texture, turgor normal. No rashes or lesions   Lymph nodes: Cervical, supraclavicular, and axillary nodes normal.   Neurologic: CNII-XII intact. Normal strength, sensation and reflexes throughout. No results found for this or any previous visit (from the past 24 hour(s)).     Assessment:     Hospital Problems  Never Reviewed            Codes Class Noted POA    Psychosis ([facility] [address] ICD-10-CM: F29  ICD-9-CM: 298.9  12/15/2022 Unknown        Bipolar disorder with psychotic features [facility]) ICD-10-CM: F31.9  ICD-9-CM: 296.80  12/15/2022 Unknown           Plan:       Current Facility-Administered Medications:     risperiDONE (RisperDAL) tablet 3 mg, 3 mg, Oral, BID, Zacarias JACOBS MD, 3 mg at 12/30/22 0869    simethicone (MYLICON) tablet 80 mg, 80 mg, Oral, QID PRN, Angela Rendon MD    divalproex  (DEPAKOTE) tablet 500 mg, 500 mg, Oral, DAILY, Joelle Perez MD, 500 mg at 12/30/22 6475    divalproex DR (DEPAKOTE) tablet 1,000 mg, 1,000 mg, Oral, QHS, Robert Perez MD, 1,000 mg at 12/29/22 2034    alum-mag hydroxide-simeth (MYLANTA) oral suspension 30 mL, 30 mL, Oral, QID PRN, Nicholas Figueroa MD, 30 mL at 12/21/22 2121    hydrOXYzine HCL (ATARAX) tablet 50 mg, 50 mg, Oral, TID PRN, Nicholas Figueroa MD    traZODone (DESYREL) tablet 50 mg, 50 mg, Oral, QHS PRN, Nicholas Figueroa MD, 50 mg at 12/16/22 2056    acetaminophen (TYLENOL) tablet 650 mg, 650 mg, Oral, Q4H PRN, Joelle Perez MD    magnesium hydroxide (MILK OF MAGNESIA) 400 mg/5 mL oral suspension 30 mL, 30 mL, Oral, DAILY PRN, Joelle Perez MD, 30 mL at 12/19/22 1940    OLANZapine (ZyPREXA) tablet 10 mg, 10 mg, Oral, Q8H PRN, Joelle Perez MD, 10 mg at 12/24/22 1728    OLANZapine (ZyPREXA) injection 10 mg, 10 mg, IntraMUSCular, Q8H PRN, Joelle Perez MD, 10 mg at 12/16/22 1117    LORazepam (ATIVAN) tablet 1 mg, 1 mg, Oral, Q6H PRN, Joelle Perez MD, 1 mg at 12/24/22 1728    LORazepam (ATIVAN) injection 1 mg, 1 mg, IntraMUSCular, Q6H PRN, Nicholas Figueroa MD, 1 mg at 12/20/22 1020      Signed By: Liat Flores MD     December 30, 2022

## 2022-12-30 NOTE — GROUP NOTE
IP  GROUP DOCUMENTATION INDIVIDUAL                                                                          Group Therapy Note    Date: 12/30/2022    Group Start Time: 1545  Group End Time: 1625  Group Topic: Recreational/Music Therapy    SRM 2  NON ACUTE    Isabela Scott    IP [facility] GROUP DOCUMENTATION GROUP    Group Therapy Note    Facilitated leisure skills group to reinforce positive coping and to manage mood through music, social interaction, group activities and art task    Attendees: 6/11       Attendance: Attended    Patient's Goal:  Attend group daily     Interventions/techniques: Art integration and Supported    Follows Directions: Followed directions    Interactions: Interacted appropriately    Mental Status: Calm    Behavior/appearance: Cooperative    Goals Achieved: Able to engage in interactions and Able to listen to others      Additional Notes:  Receptive to listening to music and a song she selected. Declined to work on leisure task.  Interacted with peer and staff when prompted    CORNELL Dominguez

## 2022-12-31 VITALS
RESPIRATION RATE: 18 BRPM | OXYGEN SATURATION: 96 % | SYSTOLIC BLOOD PRESSURE: 120 MMHG | HEART RATE: 89 BPM | TEMPERATURE: 97.9 F | DIASTOLIC BLOOD PRESSURE: 77 MMHG

## 2022-12-31 PROCEDURE — 74011250637 HC RX REV CODE- 250/637: Performed by: PSYCHIATRY & NEUROLOGY

## 2022-12-31 RX ADMIN — RISPERIDONE 3 MG: 2 TABLET ORAL at 08:51

## 2022-12-31 RX ADMIN — DIVALPROEX SODIUM 500 MG: 500 TABLET, DELAYED RELEASE ORAL at 08:51

## 2022-12-31 NOTE — PROGRESS NOTES
Consult    Patient: Demond Cox MRN: [id number]  SSN: [id number]    YOB: 1975  Age: 52 y.o. Sex: female      Subjective:      Demond Cox is a 52 y.o. female who is being seen for inpatient evaluation and medical evaluation denies any chest pain or shortness of breath no cough no chills. Past Medical History:   Diagnosis Date    Hyperlipidemia     Low back pain     Related to fall in elevator; Worker's comp physician was Dr. Zachary Bishop; Saw Dr. Dorinda Moore with [facility]    Psychiatric disorder     depression and anxiety; Hospitalized at [facility] Aug 2008    Vertigo 2008     No past surgical history on file. No family history on file.   Social History     Tobacco Use    Smoking status: Not on file    Smokeless tobacco: Not on file   Substance Use Topics    Alcohol use: Not on file      Current Facility-Administered Medications   Medication Dose Route Frequency Provider Last Rate Last Admin    risperiDONE (RisperDAL) tablet 3 mg  3 mg Oral BID Tres JACOBS MD   3 mg at 12/31/22 0851    simethicone (MYLICON) tablet 80 mg  80 mg Oral QID PRN Tres JACOBS MD        divalproex  (DEPAKOTE) tablet 500 mg  500 mg Oral DAILY Joelle Perez MD   500 mg at 12/31/22 0851    divalproex  (DEPAKOTE) tablet 1,000 mg  1,000 mg Oral QHS Lea Green MD   1,000 mg at 12/30/22 2119    alum-mag hydroxide-simeth (MYLANTA) oral suspension 30 mL  30 mL Oral QID PRN Lea Green MD   30 mL at 12/21/22 2121    hydrOXYzine HCL (ATARAX) tablet 50 mg  50 mg Oral TID PRN Lea rGeen MD        traZODone (DESYREL) tablet 50 mg  50 mg Oral QHS PRN Lea Green MD   50 mg at 12/16/22 2056    acetaminophen (TYLENOL) tablet 650 mg  650 mg Oral Q4H PRN Lea Green MD   650 mg at 12/30/22 2119    magnesium hydroxide (MILK OF MAGNESIA) 400 mg/5 mL oral suspension 30 mL  30 mL Oral DAILY PRN Lea Green MD   30 mL at 12/19/22 1940    OLANZapine (ZyPREXA) tablet 10 mg  10 mg Oral Q8H PRN Angeles Perez, MD   10 mg at 12/24/22 1728    OLANZapine (ZyPREXA) injection 10 mg  10 mg IntraMUSCular Q8H PRN Carine Vera MD   10 mg at 12/16/22 1117    LORazepam (ATIVAN) tablet 1 mg  1 mg Oral Q6H PRN Carine Vera MD   1 mg at 12/24/22 1728    LORazepam (ATIVAN) injection 1 mg  1 mg IntraMUSCular Q6H PRN Carine Vera MD   1 mg at 12/20/22 1020        Allergies   Allergen Reactions    Haldol [Haloperidol Lactate] Other (comments)     She had an unspecified \"reaction\" in 2008       Review of Systems:  A comprehensive review of systems was negative except for that written in the History of Present Illness. Objective:     Vitals:    12/29/22 0818 12/29/22 2135 12/30/22 1157 12/30/22 2030   BP: 124/73 98/79 93/60 (!) 147/90   Pulse: 92 96 95 (!) 101   Resp: 18 18 18 20   Temp: 97.6 °F (36.4 °C) 98.7 °F (37.1 °C) 98 °F (36.7 °C) 97.8 °F (36.6 °C)   SpO2:            Physical Exam:  General:  Alert, cooperative, no distress, appears stated age. Eyes:  Conjunctivae/corneas clear. PERRL, EOMs intact. Fundi benign   Ears:  Normal TMs and external ear canals both ears. Nose: Nares normal. Septum midline. Mucosa normal. No drainage or sinus tenderness. Mouth/Throat: Lips, mucosa, and tongue normal. Teeth and gums normal.   Neck: Supple, symmetrical, trachea midline, no adenopathy, thyroid: no enlargment/tenderness/nodules, no carotid bruit and no JVD. Back:   Symmetric, no curvature. ROM normal. No CVA tenderness. Lungs:   Clear to auscultation bilaterally. Heart:  Regular rate and rhythm, S1, S2 normal, no murmur, click, rub or gallop. Abdomen:   Soft, non-tender. Bowel sounds normal. No masses,  No organomegaly. Extremities: Extremities normal, atraumatic, no cyanosis or edema. Pulses: 2+ and symmetric all extremities. Skin: Skin color, texture, turgor normal. No rashes or lesions   Lymph nodes: Cervical, supraclavicular, and axillary nodes normal.   Neurologic: CNII-XII intact.  Normal strength, sensation and reflexes throughout. No results found for this or any previous visit (from the past 24 hour(s)).     Assessment:     Hospital Problems  Never Reviewed            Codes Class Noted POA    Psychosis ([facility] [address] ICD-10-CM: F29  ICD-9-CM: 298.9  12/15/2022 Unknown        Bipolar disorder with psychotic features [facility]) ICD-10-CM: F31.9  ICD-9-CM: 296.80  12/15/2022 Unknown           Plan:       Current Facility-Administered Medications:     risperiDONE (RisperDAL) tablet 3 mg, 3 mg, Oral, BID, Ganesh Patel MD, 3 mg at 12/31/22 0851    simethicone (MYLICON) tablet 80 mg, 80 mg, Oral, QID PRN, Katharine Seals MD    divalproex  (DEPAKOTE) tablet 500 mg, 500 mg, Oral, DAILY, Joelle Perez MD, 500 mg at 12/31/22 0851    divalproex  (DEPAKOTE) tablet 1,000 mg, 1,000 mg, Oral, QHS, Chris, Luann Madrid MD, 1,000 mg at 12/30/22 2119    alum-mag hydroxide-simeth (MYLANTA) oral suspension 30 mL, 30 mL, Oral, QID PRN, Kishan Lynn MD, 30 mL at 12/21/22 2121    hydrOXYzine HCL (ATARAX) tablet 50 mg, 50 mg, Oral, TID PRN, Kishan Lynn MD    traZODone (DESYREL) tablet 50 mg, 50 mg, Oral, QHS PRN, Joelle Perez MD, 50 mg at 12/16/22 2056    acetaminophen (TYLENOL) tablet 650 mg, 650 mg, Oral, Q4H PRN, Joelle Perez MD, 650 mg at 12/30/22 2119    magnesium hydroxide (MILK OF MAGNESIA) 400 mg/5 mL oral suspension 30 mL, 30 mL, Oral, DAILY PRN, Joelle Perez MD, 30 mL at 12/19/22 1940    OLANZapine (ZyPREXA) tablet 10 mg, 10 mg, Oral, Q8H PRN, Joelle Perez MD, 10 mg at 12/24/22 1728    OLANZapine (ZyPREXA) injection 10 mg, 10 mg, IntraMUSCular, Q8H PRN, Kishan Lynn MD, 10 mg at 12/16/22 1117    LORazepam (ATIVAN) tablet 1 mg, 1 mg, Oral, Q6H PRN, Joelle Perez MD, 1 mg at 12/24/22 1728    LORazepam (ATIVAN) injection 1 mg, 1 mg, IntraMUSCular, Q6H PRN, Kishan Lynn MD, 1 mg at 12/20/22 1020      Signed By: Maicol Johnston MD     December 31, 2022

## 2022-12-31 NOTE — PROGRESS NOTES
Problem: Psychosis  Goal: *STG: Remains safe in hospital  Outcome: Progressing Towards Goal  Goal: *STG/LTG: Complies with medication therapy  Outcome: Progressing Towards Goal  Goal: *STG/LTG: Demonstrates improved thought patterns as evidenced by logical and coherent speech  Outcome: Progressing Towards Goal

## 2022-12-31 NOTE — BH NOTES
DAY SHIFT & DISCHARGE NOTE    Pt up awaiting breakfast; ate breakfast and attending to ADLs. Pt eager to get discharge to take care of some errands. Pt states she has no depression and anxiety, denies SI/HI, denies hallucinations and delusions. Pt gave medications from [facility] with discharge instructions, no questions or concerns voiced. Pt gave belongings from safe and storage, no concerns voiced. Pt discharged and walked out via fastcab at 10:35. Pt discharging to [facility] [place] in [facility].

## 2024-08-14 ENCOUNTER — TRANSCRIBE ORDERS (OUTPATIENT)
Facility: HOSPITAL | Age: 49
End: 2024-08-14

## 2024-08-14 ENCOUNTER — HOSPITAL ENCOUNTER (OUTPATIENT)
Facility: HOSPITAL | Age: 49
Discharge: HOME | End: 2024-08-17
Payer: MEDICARE

## 2024-08-14 DIAGNOSIS — M54.41 LOW BACK PAIN WITH RIGHT-SIDED SCIATICA, UNSPECIFIED BACK PAIN LATERALITY, UNSPECIFIED CHRONICITY: ICD-10-CM

## 2024-08-14 DIAGNOSIS — M54.9 UPPER BACK PAIN: ICD-10-CM

## 2024-08-14 DIAGNOSIS — M54.9 UPPER BACK PAIN: Primary | ICD-10-CM

## 2024-08-14 PROCEDURE — 72100 X-RAY EXAM L-S SPINE 2/3 VWS: CPT

## 2024-08-14 PROCEDURE — 72050 X-RAY EXAM NECK SPINE 4/5VWS: CPT

## 2024-12-10 NOTE — GROUP NOTE
IP  GROUP DOCUMENTATION INDIVIDUAL                                                                          Group Therapy Note    Date: 12/23/2022    Group Start Time: 1520  Group End Time: 9945  Group Topic: Recreational/Music Therapy    SRM 2  NON ACUTE    Ame Angel    IP [address] [facility] GROUP DOCUMENTATION GROUP    Group Therapy Note    Facilitated leisure skills group to reinforce positive coping and to manage mood through music, social interaction, group activities and art task    Attendees: 9/15       Attendance: Attended    Patient's Goal:  Attend group daily     Interventions/techniques: Art integration and Supported    Follows Directions: Followed directions    Interactions: Interacted appropriately    Mental Status: Calm    Behavior/appearance: Cooperative    Goals Achieved: Able to engage in interactions and Able to listen to others      Additional Notes:  Receptive to listening to music. Declined to work on leisure task.  Interacted with select peer and staff     Saira Fallon Subjective     Patient ID: Dav Crawley is a 42 y.o. male.    Chief Complaint: Healthy You (Healthy you; lipid and glucose. Medication refills. ) and Medication Problem (Patient states he has been taking 0.6 ml of Testerone but on discharge summary it noted 1 ml. Her's only taking 0.6 ml. )        HPI  Review of Systems   Constitutional:  Negative for activity change, appetite change, chills, fatigue and fever.   HENT:  Negative for nasal congestion, ear pain, hearing loss, postnasal drip and sore throat.    Respiratory:  Negative for cough, chest tightness, shortness of breath and wheezing.    Cardiovascular:  Negative for chest pain, palpitations, leg swelling and claudication.   Gastrointestinal:  Negative for abdominal pain, change in bowel habit, constipation, diarrhea, nausea and vomiting.   Genitourinary:  Negative for dysuria.   Musculoskeletal:  Negative for arthralgias, back pain, gait problem and myalgias.   Integumentary:  Negative for rash.   Neurological:  Negative for weakness and headaches.   Psychiatric/Behavioral:  Negative for suicidal ideas. The patient is not nervous/anxious.        Tobacco Use: High Risk (12/10/2024)    Patient History     Smoking Tobacco Use: Former     Smokeless Tobacco Use: Current     Passive Exposure: Never     Review of patient's allergies indicates:  No Known Allergies  Current Outpatient Medications   Medication Instructions    amLODIPine (NORVASC) 10 mg, Oral, Daily, For BLOOD PRESSURE    levothyroxine (SYNTHROID) 112 mcg, Oral, Before breakfast    lisinopriL (PRINIVIL,ZESTRIL) 20 mg, Oral, Daily, For BLOOD PRESSURE    metoprolol succinate (TOPROL-XL) 100 mg, Oral, Daily, For BLOOD PRESSURE    sulfamethoxazole-trimethoprim 800-160mg (BACTRIM DS) 800-160 mg Tab 1 tablet, Oral, 2 times daily, For INFECTION    testosterone cypionate (DEPOTESTOTERONE CYPIONATE) 200 mg, Intramuscular, Weekly     Medications Discontinued During This Encounter   Medication Reason     "lisinopriL (PRINIVIL,ZESTRIL) 20 MG tablet Reorder    metoprolol succinate (TOPROL-XL) 100 MG 24 hr tablet Reorder    levothyroxine (SYNTHROID) 112 MCG tablet Reorder    testosterone cypionate (DEPOTESTOTERONE CYPIONATE) 200 mg/mL injection Reorder    amLODIPine (NORVASC) 10 MG tablet Reorder       Past Medical History:   Diagnosis Date    Hypertension     Hypothyroidism      Health Maintenance Topics with due status: Not Due       Topic Last Completion Date    Lipid Panel 06/11/2024    RSV Vaccine (Age 60+ and Pregnant patients) Not Due     Immunization History   Administered Date(s) Administered    Influenza - Quadrivalent - PF *Preferred* (6 months and older) 11/05/2021       Objective     Body mass index is 24.27 kg/m².  Wt Readings from Last 3 Encounters:   12/10/24 81.2 kg (179 lb)   06/11/24 80.9 kg (178 lb 6.4 oz)   07/20/23 82.6 kg (182 lb)     Ht Readings from Last 3 Encounters:   12/10/24 6' 0.01" (1.829 m)   06/11/24 6' (1.829 m)   07/20/23 6' (1.829 m)     BP Readings from Last 3 Encounters:   12/10/24 119/81   06/11/24 122/85   07/20/23 126/84     Temp Readings from Last 3 Encounters:   12/10/24 97.9 °F (36.6 °C) (Oral)   06/11/24 97.3 °F (36.3 °C) (Oral)   07/20/23 97.2 °F (36.2 °C) (Oral)     Pulse Readings from Last 3 Encounters:   12/10/24 90   06/11/24 82   07/20/23 92     Resp Readings from Last 3 Encounters:   12/10/24 18   06/11/24 16   07/20/23 16     PF Readings from Last 3 Encounters:   No data found for PF       Physical Exam  Vitals and nursing note reviewed.   Constitutional:       General: He is not in acute distress.     Appearance: Normal appearance. He is not ill-appearing.   Eyes:      Extraocular Movements: Extraocular movements intact.      Pupils: Pupils are equal, round, and reactive to light.   Cardiovascular:      Rate and Rhythm: Normal rate and regular rhythm.   Pulmonary:      Effort: Pulmonary effort is normal.      Breath sounds: Normal breath sounds.   Skin:     General: " Skin is warm.      Findings: No rash.   Neurological:      General: No focal deficit present.      Mental Status: He is alert and oriented to person, place, and time. Mental status is at baseline.   Psychiatric:         Mood and Affect: Mood normal.         Behavior: Behavior normal.         Assessment and Plan     Problem List Items Addressed This Visit          Cardiac/Vascular    Primary hypertension (Chronic)    Relevant Medications    amLODIPine (NORVASC) 10 MG tablet    lisinopriL (PRINIVIL,ZESTRIL) 20 MG tablet    metoprolol succinate (TOPROL-XL) 100 MG 24 hr tablet       Endocrine    Male hypogonadism (Chronic)    Relevant Medications    testosterone cypionate (DEPOTESTOTERONE CYPIONATE) 200 mg/mL injection    Other Relevant Orders    Testosterone, Total and Free, S    Acquired hypothyroidism (Chronic)    Relevant Medications    levothyroxine (SYNTHROID) 112 MCG tablet     Other Visit Diagnoses       Encounter for annual general medical examination without abnormal findings in adult    -  Primary    Screening for diabetes mellitus        Relevant Orders    Glucose, Fasting    Screening for lipoid disorders        Relevant Orders    Lipid Panel    Folliculitis        Relevant Medications    sulfamethoxazole-trimethoprim 800-160mg (BACTRIM DS) 800-160 mg Tab            Plan:       I have reviewed the medications, allergies, and problem list.     Goal Actions:    What type of visit is the patient here for today?: Healthy You  Does the patient consent to enroll in Color Me Healthy?: Yes  Is this a Wellness Follow Up?: No  What is your overall wellness goal? (select at least one): Improve overall health  Choose 3: Lifestyle, Nutrition, Exercise  Lifestyle Actions : Take medications as prescribed  Nurtrition Actions: Eat a well-balanced diet  Exercise Actions: Recommend physical activity 30 minutes per day 3-5 times/week

## 2025-04-23 ENCOUNTER — OFFICE VISIT (OUTPATIENT)
Age: 50
End: 2025-04-23
Payer: MEDICARE

## 2025-04-23 VITALS
DIASTOLIC BLOOD PRESSURE: 67 MMHG | HEART RATE: 85 BPM | BODY MASS INDEX: 48.82 KG/M2 | WEIGHT: 293 LBS | SYSTOLIC BLOOD PRESSURE: 102 MMHG | OXYGEN SATURATION: 94 % | TEMPERATURE: 99.2 F | HEIGHT: 65 IN

## 2025-04-23 DIAGNOSIS — F32.A ANXIETY AND DEPRESSION: ICD-10-CM

## 2025-04-23 DIAGNOSIS — F41.9 ANXIETY AND DEPRESSION: ICD-10-CM

## 2025-04-23 DIAGNOSIS — G47.33 OSA (OBSTRUCTIVE SLEEP APNEA): Primary | ICD-10-CM

## 2025-04-23 PROCEDURE — G8427 DOCREV CUR MEDS BY ELIG CLIN: HCPCS | Performed by: INTERNAL MEDICINE

## 2025-04-23 PROCEDURE — 99203 OFFICE O/P NEW LOW 30 MIN: CPT | Performed by: INTERNAL MEDICINE

## 2025-04-23 PROCEDURE — 1036F TOBACCO NON-USER: CPT | Performed by: INTERNAL MEDICINE

## 2025-04-23 PROCEDURE — 3017F COLORECTAL CA SCREEN DOC REV: CPT | Performed by: INTERNAL MEDICINE

## 2025-04-23 PROCEDURE — G8417 CALC BMI ABV UP PARAM F/U: HCPCS | Performed by: INTERNAL MEDICINE

## 2025-04-23 RX ORDER — BUPROPION HYDROCHLORIDE 200 MG/1
TABLET, EXTENDED RELEASE ORAL
COMMUNITY
Start: 2025-04-08

## 2025-04-23 RX ORDER — MIRTAZAPINE 30 MG/1
TABLET, FILM COATED ORAL
COMMUNITY
Start: 2025-04-08

## 2025-04-23 RX ORDER — HYDROXYZINE HYDROCHLORIDE 50 MG/1
TABLET, FILM COATED ORAL
COMMUNITY
Start: 2025-04-18

## 2025-04-23 ASSESSMENT — SLEEP AND FATIGUE QUESTIONNAIRES
HOW LIKELY ARE YOU TO NOD OFF OR FALL ASLEEP WHILE SITTING QUIETLY AFTER LUNCH WITHOUT ALCOHOL: WOULD NEVER DOZE
HOW LIKELY ARE YOU TO NOD OFF OR FALL ASLEEP WHEN YOU ARE A PASSENGER IN A CAR FOR AN HOUR WITHOUT A BREAK: WOULD NEVER DOZE
HOW LIKELY ARE YOU TO NOD OFF OR FALL ASLEEP WHILE WATCHING TV: SLIGHT CHANCE OF DOZING
HOW LIKELY ARE YOU TO NOD OFF OR FALL ASLEEP WHILE SITTING INACTIVE IN A PUBLIC PLACE: SLIGHT CHANCE OF DOZING
HOW LIKELY ARE YOU TO NOD OFF OR FALL ASLEEP WHILE SITTING AND TALKING TO SOMEONE: WOULD NEVER DOZE
HOW LIKELY ARE YOU TO NOD OFF OR FALL ASLEEP WHILE LYING DOWN TO REST IN THE AFTERNOON WHEN CIRCUMSTANCES PERMIT: SLIGHT CHANCE OF DOZING
HOW LIKELY ARE YOU TO NOD OFF OR FALL ASLEEP WHILE SITTING AND READING: WOULD NEVER DOZE
ESS TOTAL SCORE: 3
HOW LIKELY ARE YOU TO NOD OFF OR FALL ASLEEP IN A CAR, WHILE STOPPED FOR A FEW MINUTES IN TRAFFIC: WOULD NEVER DOZE

## 2025-04-23 NOTE — PROGRESS NOTES
[address] [address]  Tel.  [phone]    Fax. [phone]     [address] [address]  Tel.  [phone]    Fax. [phone] [address] [place] [address]  Tel.  [phone]    Fax. [phone]       Tressa Lafleur is a 50 y.o. year old female seen for evaluation of a sleep disorder.       ASSESSMENT/PLAN:     Diagnosis Orders   1. CHI (obstructive sleep apnea)  PAT - Home Sleep Test      2. Anxiety and depression        3. BMI 50.0-59.9, adult            Patient has a history and examination consistent with the diagnosis of sleep apnea.    Return for follow-up after testing is completed.    * The patient currently has a Low Risk for having sleep apnea.  STOP-BANG score 3.    * Sleep testing was ordered for initial evaluation.      Orders Placed This Encounter   Procedures    PAT - Home Sleep Test     Standing Status:   Future     Expected Date:   4/23/2025     Expiration Date:   10/23/2025     Location For Sleep Study:   [facility]       * She was provided information on sleep apnea including corresponding risk factors and the importance of proper treatment.     * Treatment options were reviewed in detail. she would like to proceed with PAP therapy. Patient will be seen in follow-up in 6-8 weeks after PAP setup to gauge treatment response and adherence to therapy.     * The patient was counseled regarding proper sleep hygiene, with emphasis on ensuring sufficient total sleep time; safe driving and the benefits of exercise and weight loss.  Patient was advised to wake up regularly at the same time, she chooses to wake up at 8 AM in the mornings.  She was advised to not get in bed until she is tired and sleepy and is not watch television while in bed at all.    * All of her questions were addressed.    2. Recommended a dedicated weight loss program through

## 2025-04-23 NOTE — PATIENT INSTRUCTIONS
label. Alcohol naturally makes you sleepy and on its own can cause accidents. Combined with excessive drowsiness its effects are amplified.   Signs of Drowsy Driving:   * You don't remember driving the last few miles   * You may drift out of your [place]   * You are unable to focus and your thoughts wander   * You may yawn more often than normal   * You have difficulty keeping your eyes open / nodding off   * Missing traffic signs, speeding, or tailgating  Prevention-   Good sleep hygiene, lifestyle and behavioral choices have the most impact on drowsy driving. There is no substitute for sleep and the average person requires 8 hours nightly. If you find yourself driving drowsy, stop and sleep. Consider the sleep hygiene tips provided during your visit as well.     Medication Refill Policy: Refills for all medications require 1 week advance notice. Please have your pharmacy fax a refill request. We are unable to fax, or call in \"controled substance\" medications and you will need to pick these prescriptions up from our office.

## 2025-05-14 ENCOUNTER — TELEPHONE (OUTPATIENT)
Age: 50
End: 2025-05-14

## 2025-05-23 ENCOUNTER — PROCEDURE VISIT (OUTPATIENT)
Age: 50
End: 2025-05-23

## 2025-05-23 DIAGNOSIS — G47.33 OSA (OBSTRUCTIVE SLEEP APNEA): Primary | ICD-10-CM

## 2025-05-23 NOTE — PROGRESS NOTES
Tressa Lafleur is seen today to receive a WatchPAT home sleep apnea testing (HSAT) device.    The WatchPAT HSAT Agreement was reviewed and endorsed by patient.  General information regarding operation of the HSAT device was provided.  Patient was advised to watch the [employer] instructional video.  Patient was advised to contact patient support for any problems using the device.  Patient was advised to complete the Morning Questionnaire after awakening/ending the test.    There were no vitals taken for this visit.    Patient will return the home sleep testing unit by noon unless otherwise approved.  Patient will be contacted once the results have been reviewed by the physician, typically within 10-15 business days.    [employer] Serial Number [id number]

## 2025-05-26 ENCOUNTER — HOSPITAL ENCOUNTER (OUTPATIENT)
Facility: HOSPITAL | Age: 50
Discharge: HOME | End: 2025-05-29
Payer: MEDICARE

## 2025-05-26 PROCEDURE — 95800 SLP STDY UNATTENDED: CPT | Performed by: INTERNAL MEDICINE

## 2025-05-29 ENCOUNTER — TELEPHONE (OUTPATIENT)
Age: 50
End: 2025-05-29

## 2025-05-29 NOTE — TELEPHONE ENCOUNTER
Pt called in to see when she needed to return the HSAT, pt picked up equipment 5/23 and is currently working with her transportation to get it returned. Pt was made aware it was due back the next day usually but due to the holiday, we'd expect it back 5/27. Techs were made aware

## 2025-06-02 ENCOUNTER — CLINICAL DOCUMENTATION (OUTPATIENT)
Age: 50
End: 2025-06-02

## 2025-06-11 ENCOUNTER — CLINICAL DOCUMENTATION (OUTPATIENT)
Age: 50
End: 2025-06-11

## 2025-06-11 DIAGNOSIS — G47.33 OSA (OBSTRUCTIVE SLEEP APNEA): Primary | ICD-10-CM

## 2025-06-11 NOTE — PROGRESS NOTES
Tressa Lafleur is to be contacted by sleep technologists regarding results of WatchPAT Testing which was indicative of an average pAHI 3% of 16.1 and pAHI 4% of 8.1 per hour.  SpO2 earl was 81% and SpO2 of < 88% was 1.5 minutes.        An APAP prescription has been written and patient will be contacted by office staff regarding follow-up  in 2-3 months after initiation of therapy.    Encounter Diagnosis   Name Primary?    [facility] (obstructive sleep apnea) Yes       Orders Placed This Encounter   Procedures    DME Order for (Specify) as OP     - DME device ordered - ResMed Device with Heated Humidifer  / .   - Diagnosis: Obstructive Sleep Apnea (G47.33)  - Length of Need: Lifetime    Pressure Settin - 15 cmH2O     Nasal Cushion (Replace) 2 per month.     Nasal Interface Mask 1 every 3 months.    Headgear 1 every 6 months.     Filter(s) Disposable 2 per month.   Filter(s) Non-Disposable 1 every 6 months.      Water Chamber for Humidifier (Replace) 1 every 6 months.   Tubing with heating element 1 every 3 months.    Perform Mask Fitting per patient preference and comfort - replace as above.      Junaid Gunter MD, [facility]; NPI: [id number]  Electronically signed. 2025

## 2025-06-13 ENCOUNTER — TELEPHONE (OUTPATIENT)
Age: 50
End: 2025-06-13

## 2025-06-16 ENCOUNTER — TELEPHONE (OUTPATIENT)
Age: 50
End: 2025-06-16

## 2025-06-19 ENCOUNTER — TELEPHONE (OUTPATIENT)
Age: 50
End: 2025-06-19

## 2025-06-24 ENCOUNTER — TELEPHONE (OUTPATIENT)
Age: 50
End: 2025-06-24

## 2025-06-24 DIAGNOSIS — G47.33 OSA (OBSTRUCTIVE SLEEP APNEA): Primary | ICD-10-CM

## 2025-08-04 ENCOUNTER — CLINICAL DOCUMENTATION (OUTPATIENT)
Age: 50
End: 2025-08-04